# Patient Record
Sex: MALE | Race: WHITE | Employment: OTHER | URBAN - METROPOLITAN AREA
[De-identification: names, ages, dates, MRNs, and addresses within clinical notes are randomized per-mention and may not be internally consistent; named-entity substitution may affect disease eponyms.]

---

## 2017-02-17 RX ORDER — POLYETHYLENE GLYCOL 3350 17 G/17G
17 POWDER, FOR SOLUTION ORAL DAILY PRN
Qty: 510 G | Refills: 2 | Status: SHIPPED | OUTPATIENT
Start: 2017-02-17 | End: 2017-07-15 | Stop reason: SDUPTHER

## 2017-04-04 ENCOUNTER — OFFICE VISIT (OUTPATIENT)
Dept: FAMILY MEDICINE CLINIC | Age: 62
End: 2017-04-04
Payer: MEDICARE

## 2017-04-04 VITALS
OXYGEN SATURATION: 97 % | HEIGHT: 75 IN | BODY MASS INDEX: 25.99 KG/M2 | DIASTOLIC BLOOD PRESSURE: 78 MMHG | HEART RATE: 84 BPM | WEIGHT: 209 LBS | SYSTOLIC BLOOD PRESSURE: 124 MMHG

## 2017-04-04 DIAGNOSIS — E11.9 TYPE 2 DIABETES MELLITUS WITHOUT COMPLICATION, WITHOUT LONG-TERM CURRENT USE OF INSULIN (HCC): Primary | ICD-10-CM

## 2017-04-04 DIAGNOSIS — I10 ESSENTIAL HYPERTENSION: ICD-10-CM

## 2017-04-04 LAB
CREATININE URINE POCT: NORMAL
HBA1C MFR BLD: 6.4 %
MICROALBUMIN/CREAT 24H UR: NORMAL MG/G{CREAT}
MICROALBUMIN/CREAT UR-RTO: NORMAL

## 2017-04-04 PROCEDURE — 83036 HEMOGLOBIN GLYCOSYLATED A1C: CPT | Performed by: FAMILY MEDICINE

## 2017-04-04 PROCEDURE — G8427 DOCREV CUR MEDS BY ELIG CLIN: HCPCS | Performed by: FAMILY MEDICINE

## 2017-04-04 PROCEDURE — 82044 UR ALBUMIN SEMIQUANTITATIVE: CPT | Performed by: FAMILY MEDICINE

## 2017-04-04 PROCEDURE — G8419 CALC BMI OUT NRM PARAM NOF/U: HCPCS | Performed by: FAMILY MEDICINE

## 2017-04-04 PROCEDURE — 99213 OFFICE O/P EST LOW 20 MIN: CPT | Performed by: FAMILY MEDICINE

## 2017-04-04 PROCEDURE — 3017F COLORECTAL CA SCREEN DOC REV: CPT | Performed by: FAMILY MEDICINE

## 2017-04-04 PROCEDURE — 3044F HG A1C LEVEL LT 7.0%: CPT | Performed by: FAMILY MEDICINE

## 2017-04-04 PROCEDURE — 1036F TOBACCO NON-USER: CPT | Performed by: FAMILY MEDICINE

## 2017-04-04 RX ORDER — DIVALPROEX SODIUM 500 MG/1
TABLET, EXTENDED RELEASE ORAL
Refills: 0 | COMMUNITY
Start: 2017-03-07 | End: 2018-01-24 | Stop reason: DRUGHIGH

## 2017-04-04 RX ORDER — BENZTROPINE MESYLATE 0.5 MG/1
TABLET ORAL
Refills: 0 | COMMUNITY
Start: 2017-03-07 | End: 2017-04-04 | Stop reason: SDUPTHER

## 2017-04-04 ASSESSMENT — ENCOUNTER SYMPTOMS
CONSTIPATION: 1
SHORTNESS OF BREATH: 0
COUGH: 0
ABDOMINAL PAIN: 0
NAUSEA: 0
SORE THROAT: 0
DIARRHEA: 0
RHINORRHEA: 0

## 2017-04-06 ENCOUNTER — HOSPITAL ENCOUNTER (OUTPATIENT)
Age: 62
Discharge: HOME | End: 2017-04-06
Payer: MEDICARE

## 2017-04-06 LAB
ABSOLUTE EOS #: 0.1 K/UL (ref 0–0.4)
ABSOLUTE LYMPH #: 1.5 K/UL (ref 0.9–2.5)
ABSOLUTE MONO #: 0.6 K/UL (ref 0–1)
ALBUMIN SERPL-MCNC: 4.3 G/DL (ref 3.5–5.2)
ALBUMIN/GLOBULIN RATIO: NORMAL (ref 1–2.5)
ALP BLD-CCNC: 52 U/L (ref 40–129)
ALT SERPL-CCNC: 17 U/L (ref 5–41)
AST SERPL-CCNC: 18 U/L
BASOPHILS # BLD: 1 % (ref 0–2)
BASOPHILS ABSOLUTE: 0 K/UL (ref 0–0.2)
BILIRUB SERPL-MCNC: 0.4 MG/DL (ref 0.3–1.2)
BILIRUBIN DIRECT: <0.08 MG/DL
BILIRUBIN, INDIRECT: NORMAL MG/DL (ref 0–1)
DIFFERENTIAL TYPE: YES
EOSINOPHILS RELATIVE PERCENT: 2 % (ref 0–5)
GLOBULIN: NORMAL G/DL (ref 1.5–3.8)
HCT VFR BLD CALC: 40.3 % (ref 41–53)
HEMOGLOBIN: 13.7 G/DL (ref 13.5–17.5)
LYMPHOCYTES # BLD: 26 % (ref 13–44)
MCH RBC QN AUTO: 31.2 PG (ref 26–34)
MCHC RBC AUTO-ENTMCNC: 34.1 G/DL (ref 31–37)
MCV RBC AUTO: 91.4 FL (ref 80–100)
MONOCYTES # BLD: 11 % (ref 5–9)
PDW BLD-RTO: 14.7 % (ref 12.1–15.2)
PLATELET # BLD: 150 K/UL (ref 140–450)
PLATELET ESTIMATE: ABNORMAL
PMV BLD AUTO: ABNORMAL FL (ref 6–12)
RBC # BLD: 4.41 M/UL (ref 4.5–5.9)
RBC # BLD: ABNORMAL 10*6/UL
SEG NEUTROPHILS: 60 % (ref 39–75)
SEGMENTED NEUTROPHILS ABSOLUTE COUNT: 3.6 K/UL (ref 2.1–6.5)
TOTAL PROTEIN: 7.5 G/DL (ref 6.4–8.3)
VALPROIC ACID LEVEL: 51 UG/ML (ref 50–100)
VALPROIC DATE LAST DOSE: NORMAL
VALPROIC DOSE AMOUNT: NORMAL
VALPROIC TIME LAST DOSE: NORMAL
WBC # BLD: 5.9 K/UL (ref 3.5–11)
WBC # BLD: ABNORMAL 10*3/UL

## 2017-04-06 PROCEDURE — 36415 COLL VENOUS BLD VENIPUNCTURE: CPT

## 2017-04-06 PROCEDURE — 80164 ASSAY DIPROPYLACETIC ACD TOT: CPT

## 2017-04-06 PROCEDURE — 80076 HEPATIC FUNCTION PANEL: CPT

## 2017-04-06 PROCEDURE — 85025 COMPLETE CBC W/AUTO DIFF WBC: CPT

## 2017-05-31 ENCOUNTER — CARE COORDINATION (OUTPATIENT)
Dept: CARE COORDINATION | Age: 62
End: 2017-05-31

## 2017-06-05 RX ORDER — FINASTERIDE 5 MG/1
TABLET, FILM COATED ORAL
Qty: 30 TABLET | Refills: 11 | Status: SHIPPED | OUTPATIENT
Start: 2017-06-05 | End: 2018-06-25 | Stop reason: SDUPTHER

## 2017-06-15 ENCOUNTER — OFFICE VISIT (OUTPATIENT)
Dept: UROLOGY | Age: 62
End: 2017-06-15
Payer: MEDICARE

## 2017-06-15 ENCOUNTER — HOSPITAL ENCOUNTER (OUTPATIENT)
Age: 62
Discharge: HOME | End: 2017-06-15
Payer: MEDICARE

## 2017-06-15 VITALS
HEIGHT: 75 IN | DIASTOLIC BLOOD PRESSURE: 74 MMHG | SYSTOLIC BLOOD PRESSURE: 116 MMHG | BODY MASS INDEX: 27.48 KG/M2 | WEIGHT: 221 LBS

## 2017-06-15 DIAGNOSIS — N40.1 BENIGN NON-NODULAR PROSTATIC HYPERPLASIA WITH LOWER URINARY TRACT SYMPTOMS: Primary | ICD-10-CM

## 2017-06-15 DIAGNOSIS — N40.1 BENIGN NON-NODULAR PROSTATIC HYPERPLASIA WITH LOWER URINARY TRACT SYMPTOMS: ICD-10-CM

## 2017-06-15 DIAGNOSIS — R39.15 URINARY URGENCY: ICD-10-CM

## 2017-06-15 DIAGNOSIS — Z12.5 PROSTATE CANCER SCREENING: ICD-10-CM

## 2017-06-15 DIAGNOSIS — R35.0 URINARY FREQUENCY: ICD-10-CM

## 2017-06-15 LAB
-: NORMAL
AMORPHOUS: NORMAL
BACTERIA: NORMAL
BILIRUBIN URINE: NEGATIVE
CASTS UA: NORMAL /LPF
COLOR: YELLOW
COMMENT UA: NORMAL
CRYSTALS, UA: NORMAL /HPF
EPITHELIAL CELLS UA: NORMAL /HPF
GLUCOSE URINE: NEGATIVE
KETONES, URINE: NEGATIVE
LEUKOCYTE ESTERASE, URINE: NEGATIVE
MUCUS: NORMAL
NITRITE, URINE: NEGATIVE
OTHER OBSERVATIONS UA: NORMAL
PH UA: 7 (ref 5–8)
PROSTATE SPECIFIC ANTIGEN: 0.21 UG/L
PROTEIN UA: NEGATIVE
RBC UA: NORMAL /HPF (ref 0–2)
RENAL EPITHELIAL, UA: NORMAL /HPF
SPECIFIC GRAVITY UA: 1.01 (ref 1–1.03)
TRICHOMONAS: NORMAL
TURBIDITY: CLEAR
URINE HGB: NEGATIVE
UROBILINOGEN, URINE: NORMAL
WBC UA: NORMAL /HPF
YEAST: NORMAL

## 2017-06-15 PROCEDURE — 3017F COLORECTAL CA SCREEN DOC REV: CPT | Performed by: PHYSICIAN ASSISTANT

## 2017-06-15 PROCEDURE — 99214 OFFICE O/P EST MOD 30 MIN: CPT | Performed by: PHYSICIAN ASSISTANT

## 2017-06-15 PROCEDURE — G0103 PSA SCREENING: HCPCS

## 2017-06-15 PROCEDURE — G8419 CALC BMI OUT NRM PARAM NOF/U: HCPCS | Performed by: PHYSICIAN ASSISTANT

## 2017-06-15 PROCEDURE — G8427 DOCREV CUR MEDS BY ELIG CLIN: HCPCS | Performed by: PHYSICIAN ASSISTANT

## 2017-06-15 PROCEDURE — 81001 URINALYSIS AUTO W/SCOPE: CPT

## 2017-06-15 PROCEDURE — 51798 US URINE CAPACITY MEASURE: CPT | Performed by: PHYSICIAN ASSISTANT

## 2017-06-15 PROCEDURE — 36415 COLL VENOUS BLD VENIPUNCTURE: CPT

## 2017-06-15 PROCEDURE — 1036F TOBACCO NON-USER: CPT | Performed by: PHYSICIAN ASSISTANT

## 2017-06-15 PROCEDURE — 87086 URINE CULTURE/COLONY COUNT: CPT

## 2017-06-15 RX ORDER — TAMSULOSIN HYDROCHLORIDE 0.4 MG/1
0.4 CAPSULE ORAL EVERY EVENING
Qty: 30 CAPSULE | Refills: 11 | Status: SHIPPED | OUTPATIENT
Start: 2017-06-15 | End: 2018-06-25 | Stop reason: SDUPTHER

## 2017-06-15 ASSESSMENT — ENCOUNTER SYMPTOMS
CONSTIPATION: 0
ABDOMINAL DISTENTION: 0
COLOR CHANGE: 0
NAUSEA: 0
VOMITING: 0
DIARRHEA: 0
SHORTNESS OF BREATH: 0
ABDOMINAL PAIN: 0

## 2017-06-16 LAB
CULTURE: NO GROWTH
CULTURE: NORMAL
Lab: NORMAL
SPECIMEN DESCRIPTION: NORMAL
SPECIMEN DESCRIPTION: NORMAL
STATUS: NORMAL

## 2017-06-29 RX ORDER — LANCETS 30 GAUGE
EACH MISCELLANEOUS
Qty: 100 EACH | Refills: 3 | Status: SHIPPED | OUTPATIENT
Start: 2017-06-29

## 2017-07-17 RX ORDER — POLYETHYLENE GLYCOL 3350 17 G/17G
POWDER, FOR SOLUTION ORAL
Qty: 1 BOTTLE | Refills: 2 | Status: SHIPPED | OUTPATIENT
Start: 2017-07-17 | End: 2018-01-20 | Stop reason: ALTCHOICE

## 2017-10-05 ENCOUNTER — HOSPITAL ENCOUNTER (OUTPATIENT)
Age: 62
Discharge: HOME | End: 2017-10-05
Payer: MEDICARE

## 2017-10-05 ENCOUNTER — OFFICE VISIT (OUTPATIENT)
Dept: FAMILY MEDICINE CLINIC | Age: 62
End: 2017-10-05
Payer: MEDICARE

## 2017-10-05 VITALS
OXYGEN SATURATION: 96 % | SYSTOLIC BLOOD PRESSURE: 110 MMHG | BODY MASS INDEX: 25.86 KG/M2 | DIASTOLIC BLOOD PRESSURE: 72 MMHG | HEART RATE: 68 BPM | WEIGHT: 208 LBS | HEIGHT: 75 IN

## 2017-10-05 DIAGNOSIS — E11.9 TYPE 2 DIABETES MELLITUS WITHOUT COMPLICATION, WITHOUT LONG-TERM CURRENT USE OF INSULIN (HCC): Primary | ICD-10-CM

## 2017-10-05 DIAGNOSIS — I10 ESSENTIAL HYPERTENSION: ICD-10-CM

## 2017-10-05 DIAGNOSIS — E11.9 TYPE 2 DIABETES MELLITUS WITHOUT COMPLICATION, WITHOUT LONG-TERM CURRENT USE OF INSULIN (HCC): ICD-10-CM

## 2017-10-05 LAB
ABSOLUTE EOS #: 0.1 K/UL (ref 0–0.4)
ABSOLUTE LYMPH #: 1.5 K/UL (ref 1–4.8)
ABSOLUTE MONO #: 0.6 K/UL (ref 0–1)
ALBUMIN SERPL-MCNC: 4.4 G/DL (ref 3.5–5.2)
ALBUMIN SERPL-MCNC: 4.4 G/DL (ref 3.5–5.2)
ALBUMIN/GLOBULIN RATIO: ABNORMAL (ref 1–2.5)
ALBUMIN/GLOBULIN RATIO: NORMAL (ref 1–2.5)
ALP BLD-CCNC: 45 U/L (ref 40–129)
ALP BLD-CCNC: 45 U/L (ref 40–129)
ALT SERPL-CCNC: 17 U/L (ref 5–41)
ALT SERPL-CCNC: 17 U/L (ref 5–41)
ANION GAP SERPL CALCULATED.3IONS-SCNC: 10 MMOL/L (ref 9–17)
AST SERPL-CCNC: 19 U/L
AST SERPL-CCNC: 19 U/L
BASOPHILS # BLD: 0 %
BASOPHILS ABSOLUTE: 0 K/UL (ref 0–0.2)
BILIRUB SERPL-MCNC: 0.41 MG/DL (ref 0.3–1.2)
BILIRUB SERPL-MCNC: 0.41 MG/DL (ref 0.3–1.2)
BILIRUBIN DIRECT: <0.08 MG/DL
BILIRUBIN, INDIRECT: NORMAL MG/DL (ref 0–1)
BUN BLDV-MCNC: 19 MG/DL (ref 8–23)
BUN/CREAT BLD: 19 (ref 9–20)
CALCIUM SERPL-MCNC: 9.7 MG/DL (ref 8.6–10.4)
CHLORIDE BLD-SCNC: 103 MMOL/L (ref 98–107)
CHOLESTEROL/HDL RATIO: 2.6
CHOLESTEROL/HDL RATIO: 2.6
CHOLESTEROL: 129 MG/DL
CHOLESTEROL: 129 MG/DL
CO2: 27 MMOL/L (ref 20–31)
CREAT SERPL-MCNC: 1 MG/DL (ref 0.7–1.2)
DIFFERENTIAL TYPE: YES
EOSINOPHILS RELATIVE PERCENT: 2 %
GFR AFRICAN AMERICAN: >60 ML/MIN
GFR NON-AFRICAN AMERICAN: >60 ML/MIN
GFR SERPL CREATININE-BSD FRML MDRD: ABNORMAL ML/MIN/{1.73_M2}
GFR SERPL CREATININE-BSD FRML MDRD: ABNORMAL ML/MIN/{1.73_M2}
GLOBULIN: NORMAL G/DL (ref 1.5–3.8)
GLUCOSE BLD-MCNC: 106 MG/DL (ref 70–99)
GLUCOSE BLD-MCNC: 106 MG/DL (ref 70–99)
HBA1C MFR BLD: 5.9 %
HCT VFR BLD CALC: 40.8 % (ref 41–53)
HDLC SERPL-MCNC: 49 MG/DL
HDLC SERPL-MCNC: 49 MG/DL
HEMOGLOBIN: 13.4 G/DL (ref 13.5–17.5)
LDL CHOLESTEROL: 64 MG/DL (ref 0–130)
LDL CHOLESTEROL: 64 MG/DL (ref 0–130)
LYMPHOCYTES # BLD: 22 %
MCH RBC QN AUTO: 30.7 PG (ref 26–34)
MCHC RBC AUTO-ENTMCNC: 32.8 G/DL (ref 31–37)
MCV RBC AUTO: 93.6 FL (ref 80–100)
MONOCYTES # BLD: 8 %
PATIENT FASTING?: YES
PATIENT FASTING?: YES
PDW BLD-RTO: 14.4 % (ref 12.1–15.2)
PLATELET # BLD: 132 K/UL (ref 140–450)
PLATELET ESTIMATE: ABNORMAL
PMV BLD AUTO: ABNORMAL FL (ref 6–12)
POTASSIUM SERPL-SCNC: 4.8 MMOL/L (ref 3.7–5.3)
RBC # BLD: 4.36 M/UL (ref 4.5–5.9)
RBC # BLD: ABNORMAL 10*6/UL
SEG NEUTROPHILS: 68 %
SEGMENTED NEUTROPHILS ABSOLUTE COUNT: 4.5 K/UL (ref 2.1–6.5)
SODIUM BLD-SCNC: 140 MMOL/L (ref 135–144)
TOTAL PROTEIN: 7.7 G/DL (ref 6.4–8.3)
TOTAL PROTEIN: 7.7 G/DL (ref 6.4–8.3)
TRIGL SERPL-MCNC: 79 MG/DL
TRIGL SERPL-MCNC: 79 MG/DL
VALPROIC ACID LEVEL: 74 UG/ML (ref 50–100)
VALPROIC DATE LAST DOSE: NORMAL
VALPROIC DOSE AMOUNT: NORMAL
VALPROIC TIME LAST DOSE: NORMAL
VLDLC SERPL CALC-MCNC: NORMAL MG/DL (ref 1–30)
VLDLC SERPL CALC-MCNC: NORMAL MG/DL (ref 1–30)
WBC # BLD: 6.7 K/UL (ref 3.5–11)
WBC # BLD: ABNORMAL 10*3/UL

## 2017-10-05 PROCEDURE — 85025 COMPLETE CBC W/AUTO DIFF WBC: CPT

## 2017-10-05 PROCEDURE — 80076 HEPATIC FUNCTION PANEL: CPT

## 2017-10-05 PROCEDURE — 83036 HEMOGLOBIN GLYCOSYLATED A1C: CPT | Performed by: FAMILY MEDICINE

## 2017-10-05 PROCEDURE — 1036F TOBACCO NON-USER: CPT | Performed by: FAMILY MEDICINE

## 2017-10-05 PROCEDURE — 80053 COMPREHEN METABOLIC PANEL: CPT

## 2017-10-05 PROCEDURE — 80061 LIPID PANEL: CPT

## 2017-10-05 PROCEDURE — G8484 FLU IMMUNIZE NO ADMIN: HCPCS | Performed by: FAMILY MEDICINE

## 2017-10-05 PROCEDURE — G8417 CALC BMI ABV UP PARAM F/U: HCPCS | Performed by: FAMILY MEDICINE

## 2017-10-05 PROCEDURE — 82947 ASSAY GLUCOSE BLOOD QUANT: CPT

## 2017-10-05 PROCEDURE — 3046F HEMOGLOBIN A1C LEVEL >9.0%: CPT | Performed by: FAMILY MEDICINE

## 2017-10-05 PROCEDURE — 80164 ASSAY DIPROPYLACETIC ACD TOT: CPT

## 2017-10-05 PROCEDURE — 99213 OFFICE O/P EST LOW 20 MIN: CPT | Performed by: FAMILY MEDICINE

## 2017-10-05 PROCEDURE — 36415 COLL VENOUS BLD VENIPUNCTURE: CPT

## 2017-10-05 PROCEDURE — G8427 DOCREV CUR MEDS BY ELIG CLIN: HCPCS | Performed by: FAMILY MEDICINE

## 2017-10-05 PROCEDURE — 3017F COLORECTAL CA SCREEN DOC REV: CPT | Performed by: FAMILY MEDICINE

## 2017-10-05 RX ORDER — GABAPENTIN 300 MG/1
300 CAPSULE ORAL DAILY
Qty: 90 CAPSULE | Refills: 3 | Status: SHIPPED | OUTPATIENT
Start: 2017-10-05 | End: 2018-07-05 | Stop reason: SDUPTHER

## 2017-10-05 RX ORDER — INFLUENZA VIRUS VACCINE 15; 15; 15; 15 UG/.5ML; UG/.5ML; UG/.5ML; UG/.5ML
SUSPENSION INTRAMUSCULAR
Refills: 0 | COMMUNITY
Start: 2017-09-29 | End: 2017-10-22 | Stop reason: ALTCHOICE

## 2017-10-05 ASSESSMENT — PATIENT HEALTH QUESTIONNAIRE - PHQ9
SUM OF ALL RESPONSES TO PHQ9 QUESTIONS 1 & 2: 0
SUM OF ALL RESPONSES TO PHQ QUESTIONS 1-9: 0
2. FEELING DOWN, DEPRESSED OR HOPELESS: 0
1. LITTLE INTEREST OR PLEASURE IN DOING THINGS: 0

## 2017-10-05 ASSESSMENT — ENCOUNTER SYMPTOMS
DIARRHEA: 0
ABDOMINAL PAIN: 0
CONSTIPATION: 0
COUGH: 0
BACK PAIN: 0
RHINORRHEA: 0
SHORTNESS OF BREATH: 0
SORE THROAT: 0

## 2017-10-05 NOTE — MR AVS SNAPSHOT
After Visit Summary             Frank Mendiola   10/5/2017 11:00 AM   Office Visit    Description:  Male : 1955   Provider:  Erma Aleman DO   Department:  [facility]              Your Follow-Up and Future Appointments         Below is a list of your follow-up and future appointments. This may not be a complete list as you may have made appointments directly with providers that we are not aware of or your providers may have made some for you. Please call your providers to confirm appointments. It is important to keep your appointments. Please bring your current insurance card, photo ID, co-pay, and all medication bottles to your appointment. If self-pay, payment is expected at the time of service. Your To-Do List     Future Appointments Provider Department Dept Phone    2018 11:00 AM Erma Aleman DO [facility] PRIMARY CARE Ed Ferguson [phone]    2018 2:00 PM Ettie Scheuermann, MD [facility] Urology [phone]    Please arrive 15 minutes prior to appointment, bring photo ID and insurance card. Future Orders Complete By Expires    Comprehensive Metabolic Panel [RUI68 Custom]  10/5/2017 10/5/2018    Lipid Panel Gala Francis Custom]  1/3/2018 10/5/2018    Follow-Up    Return in about 6 months (around 2018).          Information from Your Visit        Department     Name Address Phone Fax    [facility] [address] [phone]      You Were Seen for:         Comments    Type 2 diabetes mellitus without complication, without long-term current use of insulin ([facility]   [[id number]]         Vital Signs     Blood Pressure Pulse Height Weight Oxygen Saturation Body Mass Index    110/72 (Site: Right Arm) 68 6' 3\" (1.905 m) 208 lb (94.3 kg) 96% 26 kg/m2    Smoking Status                   Never Smoker           Additional Information about your Body Mass Index (BMI) risperiDONE (RISPERDAL) 1 MG tablet Take 1 mg by mouth Take in AM    risperiDONE (RISPERDAL) 2 MG tablet Take 2 mg by mouth daily     clonazePAM (KLONOPIN) 1 MG tablet Take 1 tablet by mouth 2 times daily. Lancet Device [facility] Delico Lancet Device. Use daily    Blood Glucose Monitoring Suppl KELLY Please dispense One Touch Ultra meter    propranolol (INDERAL) 40 MG tablet Take 40 mg by mouth 2 times daily.       FLUARIX QUADRIVALENT 0.5 ML injection inject 0.5 milliliter intramuscularly    polyethylene glycol (GLYCOLAX) powder take 17GM (DISSOLVED IN WATER) by mouth once daily if needed for constipation      Allergies           No Known Allergies      We Ordered/Performed the following           POCT glycosylated hemoglobin (Hb A1C)          Additional Information        Basic Information     Date Of Birth Sex Race Ethnicity Preferred Language    1955 Male White Non-/Non  English      Problem List as of 10/5/2017  Date Reviewed: 10/5/2017                Benign non-nodular prostatic hyperplasia with lower urinary tract symptoms    Urinary retention    Generalized weakness    Scrotal irritation    Blood in stool    BPH (benign prostatic hyperplasia)    Microscopic hematuria    Yeast dermatitis of penis    Hematuria    Lower urinary tract symptoms (LUTS)    Type 2 diabetes mellitus ([facility]    Essential hypertension, benign      Your Goals as of 10/5/2017 at 11:33 AM              5/3/16       Lifestyle    Patient will remain safe in his home   On track    Patient will be compliant with following up with Psych   On track      Immunizations as of 10/5/2017     Name Date    Influenza Vaccine, unspecified formulation 10/11/2016    Influenza Virus Vaccine 8/20/2015, 8/12/2014, 8/25/2013, 9/12/2012    Influenza, High Dose 9/29/2017    Pneumococcal 13-valent Conjugate ([id number]) 8/20/2015    Pneumococcal Polysaccharide (Oarlerrbj71) 10/11/2016    Zoster 6/9/2014      Preventive Care        Date Due Additional Information  If you have questions, please contact the physician practice where you receive care. Remember, [employer] is NOT to be used for urgent needs. For medical emergencies, dial 911. For questions regarding your [employer] account call [phone]. If you have a clinical question, please call your doctor's office.

## 2017-10-05 NOTE — PROGRESS NOTES
Subjective:      Patient ID: Peg Friday is a 65 y.o. male. HPI Comments: 65year old male with history of type 2 DM, hypertension, BPH and schizophrenia for check up. Has lost 1 lb since last visit. Bring in glucose log which is difficult to read. A1c in office is 5.9 today. Medications reviewed. Review of Systems   Constitutional: Negative for chills and fever. HENT: Negative for congestion, rhinorrhea and sore throat. Respiratory: Negative for cough and shortness of breath. Cardiovascular: Negative for chest pain. Gastrointestinal: Negative for abdominal pain, constipation and diarrhea. Genitourinary: Positive for frequency and urgency. Negative for dysuria. Musculoskeletal: Negative for back pain and joint swelling. Neurological: Positive for tremors. Negative for dizziness and light-headedness. Psychiatric/Behavioral: Negative for confusion. The patient is nervous/anxious. Objective:   Physical Exam   Constitutional: He is oriented to person, place, and time. He appears well-developed and well-nourished. HENT:   Head: Normocephalic and atraumatic. Eyes: Conjunctivae are normal. No scleral icterus. Neck: No thyromegaly present. No bruits   Cardiovascular: Normal rate, regular rhythm, normal heart sounds and intact distal pulses. Pulmonary/Chest: Effort normal and breath sounds normal. He has no rales. Abdominal: Soft. Bowel sounds are normal. There is no tenderness. Musculoskeletal: He exhibits no edema or tenderness. Neurological: He is alert and oriented to person, place, and time. Skin: Skin is warm and dry. Nursing note and vitals reviewed.       Assessment:      Type 2 DM controlled  Hypertension controlled        Plan:      Continue all meds  Follow up with psych  check labs      Warrenton Kelsy received counseling on the following healthy behaviors: medication adherence  Reviewed prior labs and health maintenance  Continue current medications, diet and exercise. Discussed use, benefit, and side effects of prescribed medications. Barriers to medication compliance addressed. Patient given educational materials - see patient instructions  Was a self-tracking handout given in paper form or via [employer]? No    Requested Prescriptions     Signed Prescriptions Disp Refills    gabapentin (NEURONTIN) 300 MG capsule 90 capsule 3     Sig: Take 1 capsule by mouth daily       All patient questions answered. Patient voiced understanding. Quality Measures    Body mass index is 26 kg/(m^2). Elevated. Weight control planned discussed Healthy diet and regular exercise. BP: 110/72 Blood pressure is normal. Treatment plan consists of No treatment change needed.     Lab Results   Component Value Date    LDLCHOLESTEROL 64 10/05/2017    (goal LDL reduction with dx if diabetes is 50% LDL reduction)      PHQ Scores 10/5/2017 12/7/2015   PHQ2 Score 0 0   PHQ9 Score 0 0     Interpretation of Total Score Depression Severity: 1-4 = Minimal depression, 5-9 = Mild depression, 10-14 = Moderate depression, 15-19 = Moderately severe depression, 20-27 = Severe depression

## 2017-10-22 ENCOUNTER — HOSPITAL ENCOUNTER (EMERGENCY)
Age: 62
Discharge: HOME | End: 2017-10-22
Attending: FAMILY MEDICINE
Payer: MEDICARE

## 2017-10-22 ENCOUNTER — APPOINTMENT (OUTPATIENT)
Dept: GENERAL RADIOLOGY | Age: 62
End: 2017-10-22
Payer: MEDICARE

## 2017-10-22 VITALS
HEART RATE: 64 BPM | RESPIRATION RATE: 20 BRPM | TEMPERATURE: 98 F | DIASTOLIC BLOOD PRESSURE: 75 MMHG | OXYGEN SATURATION: 96 % | SYSTOLIC BLOOD PRESSURE: 135 MMHG

## 2017-10-22 DIAGNOSIS — R60.0 BILATERAL LOWER EXTREMITY EDEMA: Primary | ICD-10-CM

## 2017-10-22 LAB
ABSOLUTE EOS #: 0.1 K/UL (ref 0–0.4)
ABSOLUTE IMMATURE GRANULOCYTE: ABNORMAL K/UL (ref 0–0.3)
ABSOLUTE LYMPH #: 1.3 K/UL (ref 1–4.8)
ABSOLUTE MONO #: 0.5 K/UL (ref 0–1)
ALBUMIN SERPL-MCNC: 4.5 G/DL (ref 3.5–5.2)
ALBUMIN/GLOBULIN RATIO: NORMAL (ref 1–2.5)
ALP BLD-CCNC: 44 U/L (ref 40–129)
ALT SERPL-CCNC: 17 U/L (ref 5–41)
ANION GAP SERPL CALCULATED.3IONS-SCNC: 12 MMOL/L (ref 9–17)
AST SERPL-CCNC: 19 U/L
BASOPHILS # BLD: 1 %
BASOPHILS ABSOLUTE: 0 K/UL (ref 0–0.2)
BILIRUB SERPL-MCNC: 0.65 MG/DL (ref 0.3–1.2)
BILIRUBIN DIRECT: <0.08 MG/DL
BILIRUBIN, INDIRECT: NORMAL MG/DL (ref 0–1)
BNP INTERPRETATION: NORMAL
BUN BLDV-MCNC: 17 MG/DL (ref 8–23)
BUN/CREAT BLD: 18 (ref 9–20)
CALCIUM SERPL-MCNC: 9.6 MG/DL (ref 8.6–10.4)
CHLORIDE BLD-SCNC: 100 MMOL/L (ref 98–107)
CO2: 28 MMOL/L (ref 20–31)
CREAT SERPL-MCNC: 0.96 MG/DL (ref 0.7–1.2)
DIFFERENTIAL TYPE: YES
EKG ATRIAL RATE: 62 BPM
EKG P AXIS: 19 DEGREES
EKG P-R INTERVAL: 170 MS
EKG Q-T INTERVAL: 420 MS
EKG QRS DURATION: 96 MS
EKG QTC CALCULATION (BAZETT): 426 MS
EKG R AXIS: 84 DEGREES
EKG T AXIS: 52 DEGREES
EKG VENTRICULAR RATE: 62 BPM
EOSINOPHILS RELATIVE PERCENT: 2 %
GFR AFRICAN AMERICAN: >60 ML/MIN
GFR NON-AFRICAN AMERICAN: >60 ML/MIN
GFR SERPL CREATININE-BSD FRML MDRD: ABNORMAL ML/MIN/{1.73_M2}
GFR SERPL CREATININE-BSD FRML MDRD: ABNORMAL ML/MIN/{1.73_M2}
GLOBULIN: NORMAL G/DL (ref 1.5–3.8)
GLUCOSE BLD-MCNC: 105 MG/DL (ref 70–99)
HCT VFR BLD CALC: 40.6 % (ref 41–53)
HEMOGLOBIN: 13.4 G/DL (ref 13.5–17.5)
IMMATURE GRANULOCYTES: ABNORMAL %
LYMPHOCYTES # BLD: 25 %
MCH RBC QN AUTO: 31 PG (ref 26–34)
MCHC RBC AUTO-ENTMCNC: 33.2 G/DL (ref 31–37)
MCV RBC AUTO: 93.6 FL (ref 80–100)
MONOCYTES # BLD: 9 %
PDW BLD-RTO: 14.7 % (ref 12.1–15.2)
PLATELET # BLD: 152 K/UL (ref 140–450)
PLATELET ESTIMATE: ABNORMAL
PMV BLD AUTO: ABNORMAL FL (ref 6–12)
POTASSIUM SERPL-SCNC: 4.3 MMOL/L (ref 3.7–5.3)
PRO-BNP: 68 PG/ML
RBC # BLD: 4.34 M/UL (ref 4.5–5.9)
RBC # BLD: ABNORMAL 10*6/UL
SEG NEUTROPHILS: 63 %
SEGMENTED NEUTROPHILS ABSOLUTE COUNT: 3.5 K/UL (ref 2.1–6.5)
SODIUM BLD-SCNC: 140 MMOL/L (ref 135–144)
TOTAL PROTEIN: 7.6 G/DL (ref 6.4–8.3)
TROPONIN INTERP: NORMAL
TROPONIN T: <0.03 NG/ML
WBC # BLD: 5.4 K/UL (ref 3.5–11)
WBC # BLD: ABNORMAL 10*3/UL

## 2017-10-22 PROCEDURE — 85025 COMPLETE CBC W/AUTO DIFF WBC: CPT

## 2017-10-22 PROCEDURE — 83880 ASSAY OF NATRIURETIC PEPTIDE: CPT

## 2017-10-22 PROCEDURE — 84484 ASSAY OF TROPONIN QUANT: CPT

## 2017-10-22 PROCEDURE — 99285 EMERGENCY DEPT VISIT HI MDM: CPT

## 2017-10-22 PROCEDURE — 80076 HEPATIC FUNCTION PANEL: CPT

## 2017-10-22 PROCEDURE — 80048 BASIC METABOLIC PNL TOTAL CA: CPT

## 2017-10-22 PROCEDURE — 93005 ELECTROCARDIOGRAM TRACING: CPT

## 2017-10-22 PROCEDURE — 71010 XR CHEST LIMITED ONE VIEW: CPT

## 2017-10-22 RX ORDER — FUROSEMIDE 40 MG/1
40 TABLET ORAL DAILY
Qty: 7 TABLET | Refills: 0 | Status: SHIPPED | OUTPATIENT
Start: 2017-10-22 | End: 2018-01-20 | Stop reason: ALTCHOICE

## 2017-10-22 ASSESSMENT — PAIN DESCRIPTION - DESCRIPTORS: DESCRIPTORS: SORE

## 2017-10-22 ASSESSMENT — PAIN DESCRIPTION - PAIN TYPE: TYPE: ACUTE PAIN

## 2017-10-22 ASSESSMENT — PAIN DESCRIPTION - LOCATION: LOCATION: ARM

## 2017-10-22 ASSESSMENT — PAIN DESCRIPTION - FREQUENCY: FREQUENCY: INTERMITTENT

## 2017-10-22 ASSESSMENT — PAIN DESCRIPTION - ORIENTATION: ORIENTATION: RIGHT

## 2017-10-22 ASSESSMENT — PAIN DESCRIPTION - PROGRESSION: CLINICAL_PROGRESSION: NOT CHANGED

## 2017-10-22 NOTE — ED TRIAGE NOTES
List of medications patient is currently taking is complete.   Source of medications in list are the pt

## 2017-10-22 NOTE — ED PROVIDER NOTES
Deepak 1979  eMERGENCY dEPARTMENT eNCOUnter          Jermaine Vizcarra       Chief Complaint   Patient presents with    Leg Swelling     bilateral leg swelling for past month       Nurses Notes reviewed and I agree except as noted in the HPI. HISTORY OF PRESENT ILLNESS    Saloni Matias is a 65 y.o. male who presents To the emergency room via EMS from home, with patient complaining of bilateral lower extremity edema as well as right upper extremity pain. Patient states the swelling has been going on for the past month, denies any known cardiac history, denies being any type of diuretic or water pill. Patient denies any chest pain or palpitations denies shortness of breath difficulty breathing, does admit to using a lot of salt as he has been trying to add taste to increased fruit/vegetable diet, and feels he has been losing weight. Patient denies any swelling or distention of his abdomen or scrotum. Patient also indicates she's been having pain in his right upper extremity, has been gone for at least the last month, states he has discussed with his primary care provider though indicates a few months prior, and points to pain both in the proximal forearm as well as in the biceps area. Denies any known trauma or falls denies any known heavy lifting pushing or pulling. Patient states that his primary care told him he had only strained his arm. Patient does not otherwise qualify or quantify the pain. Review of EMR notes that patient PCP had noted lower extremity edema 4/2017    REVIEW OF SYSTEMS     Review of Systems   All other systems reviewed and are negative. PAST MEDICAL HISTORY    has a past medical history of Chronic schizophrenic ([address] Hyperlipidemia; Hypertension; Neuromuscular disorder ([address] and Type II or unspecified type diabetes mellitus without mention of complication, not stated as uncontrolled.     SURGICAL HISTORY      has a past surgical history that

## 2017-10-24 ENCOUNTER — CARE COORDINATION (OUTPATIENT)
Dept: CARE COORDINATION | Age: 62
End: 2017-10-24

## 2017-10-24 NOTE — CARE COORDINATION
Patient was called to follow up with most recent ER visit. There was no answer. A message was left on voicemail to have patient call back regarding ER visit. Office number given [phone].

## 2017-10-26 ENCOUNTER — OFFICE VISIT (OUTPATIENT)
Dept: FAMILY MEDICINE CLINIC | Age: 62
End: 2017-10-26
Payer: MEDICARE

## 2017-10-26 VITALS
SYSTOLIC BLOOD PRESSURE: 110 MMHG | BODY MASS INDEX: 24.75 KG/M2 | OXYGEN SATURATION: 96 % | HEART RATE: 74 BPM | WEIGHT: 198 LBS | DIASTOLIC BLOOD PRESSURE: 72 MMHG

## 2017-10-26 DIAGNOSIS — E11.9 TYPE 2 DIABETES MELLITUS WITHOUT COMPLICATION, WITHOUT LONG-TERM CURRENT USE OF INSULIN (HCC): ICD-10-CM

## 2017-10-26 DIAGNOSIS — R60.9 PITTING EDEMA: Primary | ICD-10-CM

## 2017-10-26 PROCEDURE — 3017F COLORECTAL CA SCREEN DOC REV: CPT | Performed by: FAMILY MEDICINE

## 2017-10-26 PROCEDURE — 3044F HG A1C LEVEL LT 7.0%: CPT | Performed by: FAMILY MEDICINE

## 2017-10-26 PROCEDURE — 1036F TOBACCO NON-USER: CPT | Performed by: FAMILY MEDICINE

## 2017-10-26 PROCEDURE — G8420 CALC BMI NORM PARAMETERS: HCPCS | Performed by: FAMILY MEDICINE

## 2017-10-26 PROCEDURE — 99213 OFFICE O/P EST LOW 20 MIN: CPT | Performed by: FAMILY MEDICINE

## 2017-10-26 PROCEDURE — G8484 FLU IMMUNIZE NO ADMIN: HCPCS | Performed by: FAMILY MEDICINE

## 2017-10-26 PROCEDURE — G8427 DOCREV CUR MEDS BY ELIG CLIN: HCPCS | Performed by: FAMILY MEDICINE

## 2017-10-26 ASSESSMENT — ENCOUNTER SYMPTOMS
RHINORRHEA: 0
SORE THROAT: 0
NAUSEA: 0
CONSTIPATION: 0
ABDOMINAL PAIN: 0
DIARRHEA: 0

## 2017-10-26 NOTE — PROGRESS NOTES
Subjective:      Patient ID: Alejandro Harding is a 65 y.o. male. 65year old male with history of type 2 DM, hypertension, schizophrenia . Was in ER on 10/22 because of leg swelling. Labs unremarkable, admitted to eating a lot of salty foods. Was given lasix 40 daily for 7 days and states legs are down and weight is down 10 lbs. Review of Systems   Constitutional: Negative for chills and fever. HENT: Negative for rhinorrhea and sore throat. Cardiovascular: Positive for leg swelling (improved). Gastrointestinal: Negative for abdominal pain, constipation, diarrhea and nausea. Neurological: Positive for light-headedness. Negative for dizziness and syncope. Objective:   Physical Exam   Constitutional: He is oriented to person, place, and time. HENT:   Head: Normocephalic. Mouth/Throat: No oropharyngeal exudate. Eyes: Conjunctivae are normal. No scleral icterus. Neck: No thyromegaly present. No bruits   Cardiovascular: Normal rate, regular rhythm and normal heart sounds. Pulmonary/Chest: Effort normal and breath sounds normal.   Abdominal: Soft. Bowel sounds are normal.   Musculoskeletal: He exhibits no edema. Neurological: He is alert and oriented to person, place, and time. Nursing note and vitals reviewed. Assessment:      Resolved pitting edema  Type 2 DM      Plan:      Finish lasix   Continue regular meds  Explained sodium and need for watching diet  Lima Orourke received counseling on the following healthy behaviors: medication adherence  Reviewed prior labs and health maintenance. Continue current medications, diet and exercise. Discussed use, benefit, and side effects of prescribed medications. Barriers to medication compliance addressed. Patient given educational materials - see patient instructions. All patient questions answered. Patient voiced understanding.

## 2017-11-27 NOTE — TELEPHONE ENCOUNTER
One touch ultra test strips - BID    RA-ching    [facility] Maintenance   Topic Date Due    Hepatitis C screen  1955    HIV screen  05/27/1971    DTaP/Tdap/Td vaccine (1 - Tdap) 05/27/1975    Diabetic microalbuminuria test  04/04/2018    Diabetic foot exam  06/05/2018    Diabetic retinal exam  09/22/2018    Diabetic hemoglobin A1C test  10/05/2018    Lipid screen  10/05/2018    Colon cancer screen colonoscopy  01/07/2024    Zostavax vaccine  Completed    Flu vaccine  Completed    Pneumococcal med risk  Completed             (applicable per patient's age: Cancer Screenings, Depression Screening, Fall Risk Screening, Immunizations)    Hemoglobin A1C (%)   Date Value   10/05/2017 5.9   04/04/2017 6.4   10/03/2016 5.9     Microalb/Crt.  Ratio (mcg/mg creat)   Date Value   03/09/2016 10     LDL Cholesterol (mg/dL)   Date Value   10/05/2017 64     AST (U/L)   Date Value   10/22/2017 19     ALT (U/L)   Date Value   10/22/2017 17     BUN (mg/dL)   Date Value   10/22/2017 17      (goal A1C is < 7)   (goal LDL is <100) need 30-50% reduction from baseline     BP Readings from Last 3 Encounters:   10/26/17 110/72   10/22/17 135/75   10/05/17 110/72    (goal /80)      All Future Testing planned in [facility]:  Lab Frequency Next Occurrence       Next Visit Date:  Future Appointments  Date Time Provider Daniela Canales   4/5/2018 11:00 AM DO Riaz Barrios [facility]   6/21/2018 2:00 PM Libra Vail MD Will Urol W            Patient Active Problem List:     Type 2 diabetes mellitus ([address]     Essential hypertension, benign     Hematuria     Lower urinary tract symptoms (LUTS)     BPH (benign prostatic hyperplasia)     Microscopic hematuria     Yeast dermatitis of penis     Blood in stool     Scrotal irritation     Generalized weakness     Benign non-nodular prostatic hyperplasia with lower urinary tract symptoms     Urinary retention

## 2018-01-20 ENCOUNTER — APPOINTMENT (OUTPATIENT)
Dept: GENERAL RADIOLOGY | Age: 63
DRG: 558 | End: 2018-01-20
Payer: MEDICARE

## 2018-01-20 ENCOUNTER — APPOINTMENT (OUTPATIENT)
Dept: CT IMAGING | Age: 63
DRG: 558 | End: 2018-01-20
Payer: MEDICARE

## 2018-01-20 ENCOUNTER — HOSPITAL ENCOUNTER (INPATIENT)
Age: 63
LOS: 3 days | Discharge: HOME | DRG: 558 | End: 2018-01-23
Attending: FAMILY MEDICINE | Admitting: INTERNAL MEDICINE
Payer: MEDICARE

## 2018-01-20 DIAGNOSIS — R53.1 GENERALIZED WEAKNESS: ICD-10-CM

## 2018-01-20 DIAGNOSIS — T79.6XXA TRAUMATIC RHABDOMYOLYSIS, INITIAL ENCOUNTER (HCC): Primary | ICD-10-CM

## 2018-01-20 PROBLEM — M62.82 RHABDOMYOLYSIS: Status: ACTIVE | Noted: 2018-01-20

## 2018-01-20 LAB
-: NORMAL
ABSOLUTE EOS #: 0 K/UL (ref 0–0.4)
ABSOLUTE IMMATURE GRANULOCYTE: ABNORMAL K/UL (ref 0–0.3)
ABSOLUTE LYMPH #: 0.9 K/UL (ref 1–4.8)
ABSOLUTE MONO #: 0.8 K/UL (ref 0–1)
ALBUMIN SERPL-MCNC: 4 G/DL (ref 3.5–5.2)
ALBUMIN/GLOBULIN RATIO: ABNORMAL (ref 1–2.5)
ALP BLD-CCNC: 51 U/L (ref 40–129)
ALT SERPL-CCNC: 18 U/L (ref 5–41)
AMORPHOUS: NORMAL
ANION GAP SERPL CALCULATED.3IONS-SCNC: 11 MMOL/L (ref 9–17)
AST SERPL-CCNC: 33 U/L
BACTERIA: NORMAL
BASOPHILS # BLD: 0 % (ref 0–2)
BASOPHILS ABSOLUTE: 0 K/UL (ref 0–0.2)
BILIRUB SERPL-MCNC: 0.54 MG/DL (ref 0.3–1.2)
BILIRUBIN URINE: NEGATIVE
BUN BLDV-MCNC: 20 MG/DL (ref 8–23)
BUN/CREAT BLD: 22 (ref 9–20)
CALCIUM SERPL-MCNC: 9.5 MG/DL (ref 8.6–10.4)
CASTS UA: NORMAL /LPF
CHLORIDE BLD-SCNC: 102 MMOL/L (ref 98–107)
CO2: 27 MMOL/L (ref 20–31)
COLOR: YELLOW
COMMENT UA: ABNORMAL
CREAT SERPL-MCNC: 0.89 MG/DL (ref 0.7–1.2)
CRYSTALS, UA: NORMAL /HPF
DIFFERENTIAL TYPE: YES
EKG ATRIAL RATE: 49 BPM
EKG P AXIS: 33 DEGREES
EKG P-R INTERVAL: 154 MS
EKG Q-T INTERVAL: 466 MS
EKG QRS DURATION: 138 MS
EKG QTC CALCULATION (BAZETT): 420 MS
EKG R AXIS: 98 DEGREES
EKG T AXIS: 42 DEGREES
EKG VENTRICULAR RATE: 49 BPM
EOSINOPHILS RELATIVE PERCENT: 0 % (ref 0–5)
EPITHELIAL CELLS UA: NORMAL /HPF
ESTIMATED AVERAGE GLUCOSE: 117 MG/DL
GFR AFRICAN AMERICAN: >60 ML/MIN
GFR NON-AFRICAN AMERICAN: >60 ML/MIN
GFR SERPL CREATININE-BSD FRML MDRD: ABNORMAL ML/MIN/{1.73_M2}
GFR SERPL CREATININE-BSD FRML MDRD: ABNORMAL ML/MIN/{1.73_M2}
GLUCOSE BLD-MCNC: 116 MG/DL (ref 70–99)
GLUCOSE URINE: NEGATIVE
HBA1C MFR BLD: 5.7 % (ref 4.8–5.9)
HCT VFR BLD CALC: 40.8 % (ref 41–53)
HEMOGLOBIN: 13.4 G/DL (ref 13.5–17.5)
IMMATURE GRANULOCYTES: ABNORMAL %
INR BLD: 1
KETONES, URINE: ABNORMAL
LEUKOCYTE ESTERASE, URINE: NEGATIVE
LYMPHOCYTES # BLD: 9 % (ref 13–44)
MCH RBC QN AUTO: 30.4 PG (ref 26–34)
MCHC RBC AUTO-ENTMCNC: 32.9 G/DL (ref 31–37)
MCV RBC AUTO: 92.6 FL (ref 80–100)
MONOCYTES # BLD: 8 % (ref 5–9)
MUCUS: NORMAL
MYOGLOBIN: 2249 NG/ML (ref 28–72)
NITRITE, URINE: NEGATIVE
NRBC AUTOMATED: ABNORMAL PER 100 WBC
OTHER OBSERVATIONS UA: NORMAL
PARTIAL THROMBOPLASTIN TIME: 26.9 SEC (ref 21–33)
PDW BLD-RTO: 14.9 % (ref 12.1–15.2)
PH UA: 6 (ref 5–8)
PLATELET # BLD: 142 K/UL (ref 140–450)
PLATELET ESTIMATE: ABNORMAL
PMV BLD AUTO: ABNORMAL FL (ref 6–12)
POTASSIUM SERPL-SCNC: 3.7 MMOL/L (ref 3.7–5.3)
PROTEIN UA: NEGATIVE
PROTHROMBIN TIME: 10.5 SEC (ref 9–11.6)
RBC # BLD: 4.41 M/UL (ref 4.5–5.9)
RBC # BLD: ABNORMAL 10*6/UL
RBC UA: NORMAL /HPF (ref 0–2)
RENAL EPITHELIAL, UA: NORMAL /HPF
SEG NEUTROPHILS: 83 % (ref 39–75)
SEGMENTED NEUTROPHILS ABSOLUTE COUNT: 8.4 K/UL (ref 2.1–6.5)
SODIUM BLD-SCNC: 140 MMOL/L (ref 135–144)
SPECIFIC GRAVITY UA: 1.02 (ref 1–1.03)
TOTAL CK: 1456 U/L (ref 39–308)
TOTAL PROTEIN: 6.7 G/DL (ref 6.4–8.3)
TRICHOMONAS: NORMAL
TROPONIN INTERP: NORMAL
TROPONIN T: <0.03 NG/ML
TURBIDITY: CLEAR
URINE HGB: ABNORMAL
UROBILINOGEN, URINE: NORMAL
WBC # BLD: 10.2 K/UL (ref 3.5–11)
WBC # BLD: ABNORMAL 10*3/UL
WBC UA: NORMAL /HPF
YEAST: NORMAL

## 2018-01-20 PROCEDURE — 82947 ASSAY GLUCOSE BLOOD QUANT: CPT

## 2018-01-20 PROCEDURE — 84484 ASSAY OF TROPONIN QUANT: CPT

## 2018-01-20 PROCEDURE — 51702 INSERT TEMP BLADDER CATH: CPT

## 2018-01-20 PROCEDURE — 93005 ELECTROCARDIOGRAM TRACING: CPT

## 2018-01-20 PROCEDURE — 99285 EMERGENCY DEPT VISIT HI MDM: CPT

## 2018-01-20 PROCEDURE — 1200000000 HC SEMI PRIVATE

## 2018-01-20 PROCEDURE — 2580000003 HC RX 258: Performed by: INTERNAL MEDICINE

## 2018-01-20 PROCEDURE — 70450 CT HEAD/BRAIN W/O DYE: CPT

## 2018-01-20 PROCEDURE — 6370000000 HC RX 637 (ALT 250 FOR IP): Performed by: INTERNAL MEDICINE

## 2018-01-20 PROCEDURE — 85025 COMPLETE CBC W/AUTO DIFF WBC: CPT

## 2018-01-20 PROCEDURE — 36415 COLL VENOUS BLD VENIPUNCTURE: CPT

## 2018-01-20 PROCEDURE — 85610 PROTHROMBIN TIME: CPT

## 2018-01-20 PROCEDURE — 83874 ASSAY OF MYOGLOBIN: CPT

## 2018-01-20 PROCEDURE — 72125 CT NECK SPINE W/O DYE: CPT

## 2018-01-20 PROCEDURE — 81001 URINALYSIS AUTO W/SCOPE: CPT

## 2018-01-20 PROCEDURE — 82550 ASSAY OF CK (CPK): CPT

## 2018-01-20 PROCEDURE — 71045 X-RAY EXAM CHEST 1 VIEW: CPT

## 2018-01-20 PROCEDURE — 2580000003 HC RX 258: Performed by: FAMILY MEDICINE

## 2018-01-20 PROCEDURE — 85730 THROMBOPLASTIN TIME PARTIAL: CPT

## 2018-01-20 PROCEDURE — 80053 COMPREHEN METABOLIC PANEL: CPT

## 2018-01-20 PROCEDURE — 83036 HEMOGLOBIN GLYCOSYLATED A1C: CPT

## 2018-01-20 RX ORDER — TAMSULOSIN HYDROCHLORIDE 0.4 MG/1
0.4 CAPSULE ORAL EVERY EVENING
Status: DISCONTINUED | OUTPATIENT
Start: 2018-01-20 | End: 2018-01-23 | Stop reason: HOSPADM

## 2018-01-20 RX ORDER — RISPERIDONE 2 MG/1
2 TABLET, FILM COATED ORAL NIGHTLY
Status: DISCONTINUED | OUTPATIENT
Start: 2018-01-20 | End: 2018-01-23 | Stop reason: HOSPADM

## 2018-01-20 RX ORDER — NICOTINE POLACRILEX 4 MG
15 LOZENGE BUCCAL PRN
Status: DISCONTINUED | OUTPATIENT
Start: 2018-01-20 | End: 2018-01-23 | Stop reason: HOSPADM

## 2018-01-20 RX ORDER — CLONAZEPAM 0.5 MG/1
1 TABLET ORAL 2 TIMES DAILY
Status: DISCONTINUED | OUTPATIENT
Start: 2018-01-21 | End: 2018-01-23 | Stop reason: HOSPADM

## 2018-01-20 RX ORDER — 0.9 % SODIUM CHLORIDE 0.9 %
1000 INTRAVENOUS SOLUTION INTRAVENOUS ONCE
Status: COMPLETED | OUTPATIENT
Start: 2018-01-20 | End: 2018-01-20

## 2018-01-20 RX ORDER — BENZTROPINE MESYLATE 0.5 MG/1
0.5 TABLET ORAL 2 TIMES DAILY
Status: DISCONTINUED | OUTPATIENT
Start: 2018-01-20 | End: 2018-01-23 | Stop reason: HOSPADM

## 2018-01-20 RX ORDER — GABAPENTIN 300 MG/1
300 CAPSULE ORAL DAILY
Status: DISCONTINUED | OUTPATIENT
Start: 2018-01-21 | End: 2018-01-23 | Stop reason: HOSPADM

## 2018-01-20 RX ORDER — DIVALPROEX SODIUM 250 MG/1
500 TABLET, EXTENDED RELEASE ORAL 2 TIMES DAILY
Status: DISCONTINUED | OUTPATIENT
Start: 2018-01-20 | End: 2018-01-21

## 2018-01-20 RX ORDER — ONDANSETRON 2 MG/ML
4 INJECTION INTRAMUSCULAR; INTRAVENOUS EVERY 6 HOURS PRN
Status: DISCONTINUED | OUTPATIENT
Start: 2018-01-20 | End: 2018-01-23 | Stop reason: HOSPADM

## 2018-01-20 RX ORDER — PRIMIDONE 50 MG/1
25 TABLET ORAL NIGHTLY
Status: DISCONTINUED | OUTPATIENT
Start: 2018-01-20 | End: 2018-01-23 | Stop reason: HOSPADM

## 2018-01-20 RX ORDER — PROPRANOLOL HYDROCHLORIDE 10 MG/1
40 TABLET ORAL 2 TIMES DAILY
Status: DISCONTINUED | OUTPATIENT
Start: 2018-01-20 | End: 2018-01-23 | Stop reason: HOSPADM

## 2018-01-20 RX ORDER — SODIUM CHLORIDE 9 MG/ML
INJECTION, SOLUTION INTRAVENOUS CONTINUOUS
Status: DISCONTINUED | OUTPATIENT
Start: 2018-01-20 | End: 2018-01-21

## 2018-01-20 RX ORDER — DEXTROSE MONOHYDRATE 50 MG/ML
100 INJECTION, SOLUTION INTRAVENOUS PRN
Status: DISCONTINUED | OUTPATIENT
Start: 2018-01-20 | End: 2018-01-23 | Stop reason: HOSPADM

## 2018-01-20 RX ORDER — SODIUM CHLORIDE 9 MG/ML
INJECTION, SOLUTION INTRAVENOUS CONTINUOUS
Status: DISCONTINUED | OUTPATIENT
Start: 2018-01-20 | End: 2018-01-23 | Stop reason: HOSPADM

## 2018-01-20 RX ORDER — SODIUM CHLORIDE 0.9 % (FLUSH) 0.9 %
10 SYRINGE (ML) INJECTION EVERY 12 HOURS SCHEDULED
Status: DISCONTINUED | OUTPATIENT
Start: 2018-01-20 | End: 2018-01-23 | Stop reason: HOSPADM

## 2018-01-20 RX ORDER — FINASTERIDE 5 MG/1
5 TABLET, FILM COATED ORAL DAILY
Status: DISCONTINUED | OUTPATIENT
Start: 2018-01-21 | End: 2018-01-23 | Stop reason: HOSPADM

## 2018-01-20 RX ORDER — ACETAMINOPHEN 325 MG/1
650 TABLET ORAL EVERY 4 HOURS PRN
Status: DISCONTINUED | OUTPATIENT
Start: 2018-01-20 | End: 2018-01-23 | Stop reason: HOSPADM

## 2018-01-20 RX ORDER — RISPERIDONE 0.5 MG/1
1 TABLET, FILM COATED ORAL DAILY
Status: DISCONTINUED | OUTPATIENT
Start: 2018-01-21 | End: 2018-01-23 | Stop reason: HOSPADM

## 2018-01-20 RX ORDER — DEXTROSE MONOHYDRATE 25 G/50ML
12.5 INJECTION, SOLUTION INTRAVENOUS PRN
Status: DISCONTINUED | OUTPATIENT
Start: 2018-01-20 | End: 2018-01-23 | Stop reason: HOSPADM

## 2018-01-20 RX ORDER — SODIUM CHLORIDE 0.9 % (FLUSH) 0.9 %
10 SYRINGE (ML) INJECTION PRN
Status: DISCONTINUED | OUTPATIENT
Start: 2018-01-20 | End: 2018-01-23 | Stop reason: HOSPADM

## 2018-01-20 RX ADMIN — RISPERIDONE 2 MG: 0.5 TABLET, FILM COATED ORAL at 23:09

## 2018-01-20 RX ADMIN — SODIUM CHLORIDE: 9 INJECTION, SOLUTION INTRAVENOUS at 17:51

## 2018-01-20 RX ADMIN — BENZTROPINE MESYLATE 0.5 MG: 0.5 TABLET ORAL at 23:10

## 2018-01-20 RX ADMIN — PRIMIDONE 25 MG: 50 TABLET ORAL at 23:13

## 2018-01-20 RX ADMIN — SODIUM CHLORIDE: 9 INJECTION, SOLUTION INTRAVENOUS at 21:22

## 2018-01-20 RX ADMIN — SODIUM CHLORIDE 1000 ML: 9 INJECTION, SOLUTION INTRAVENOUS at 20:09

## 2018-01-20 RX ADMIN — TAMSULOSIN HYDROCHLORIDE 0.4 MG: 0.4 CAPSULE ORAL at 23:10

## 2018-01-20 RX ADMIN — PROPRANOLOL HYDROCHLORIDE 40 MG: 10 TABLET ORAL at 23:10

## 2018-01-20 RX ADMIN — SODIUM CHLORIDE: 9 INJECTION, SOLUTION INTRAVENOUS at 23:01

## 2018-01-20 ASSESSMENT — PAIN SCALES - GENERAL: PAINLEVEL_OUTOF10: 0

## 2018-01-21 LAB
ANION GAP SERPL CALCULATED.3IONS-SCNC: 11 MMOL/L (ref 9–17)
BUN BLDV-MCNC: 14 MG/DL (ref 8–23)
BUN/CREAT BLD: 18 (ref 9–20)
CALCIUM SERPL-MCNC: 8.9 MG/DL (ref 8.6–10.4)
CHLORIDE BLD-SCNC: 107 MMOL/L (ref 98–107)
CO2: 25 MMOL/L (ref 20–31)
CREAT SERPL-MCNC: 0.77 MG/DL (ref 0.7–1.2)
GFR AFRICAN AMERICAN: >60 ML/MIN
GFR NON-AFRICAN AMERICAN: >60 ML/MIN
GFR SERPL CREATININE-BSD FRML MDRD: NORMAL ML/MIN/{1.73_M2}
GFR SERPL CREATININE-BSD FRML MDRD: NORMAL ML/MIN/{1.73_M2}
GLUCOSE BLD-MCNC: 103 MG/DL (ref 65–99)
GLUCOSE BLD-MCNC: 132 MG/DL (ref 65–99)
GLUCOSE BLD-MCNC: 140 MG/DL (ref 65–99)
GLUCOSE BLD-MCNC: 97 MG/DL (ref 70–99)
GLUCOSE BLD-MCNC: 98 MG/DL (ref 65–99)
MYOGLOBIN: 585 NG/ML (ref 28–72)
POTASSIUM SERPL-SCNC: 3.8 MMOL/L (ref 3.7–5.3)
SODIUM BLD-SCNC: 143 MMOL/L (ref 135–144)

## 2018-01-21 PROCEDURE — 97162 PT EVAL MOD COMPLEX 30 MIN: CPT

## 2018-01-21 PROCEDURE — 2580000003 HC RX 258: Performed by: INTERNAL MEDICINE

## 2018-01-21 PROCEDURE — 6370000000 HC RX 637 (ALT 250 FOR IP): Performed by: INTERNAL MEDICINE

## 2018-01-21 PROCEDURE — 6360000002 HC RX W HCPCS: Performed by: INTERNAL MEDICINE

## 2018-01-21 PROCEDURE — 36415 COLL VENOUS BLD VENIPUNCTURE: CPT

## 2018-01-21 PROCEDURE — 94761 N-INVAS EAR/PLS OXIMETRY MLT: CPT

## 2018-01-21 PROCEDURE — 80048 BASIC METABOLIC PNL TOTAL CA: CPT

## 2018-01-21 PROCEDURE — 97166 OT EVAL MOD COMPLEX 45 MIN: CPT

## 2018-01-21 PROCEDURE — 83874 ASSAY OF MYOGLOBIN: CPT

## 2018-01-21 PROCEDURE — 1200000000 HC SEMI PRIVATE

## 2018-01-21 PROCEDURE — 82947 ASSAY GLUCOSE BLOOD QUANT: CPT

## 2018-01-21 RX ORDER — TRAMADOL HYDROCHLORIDE 50 MG/1
50 TABLET ORAL 2 TIMES DAILY PRN
Status: DISCONTINUED | OUTPATIENT
Start: 2018-01-21 | End: 2018-01-21

## 2018-01-21 RX ORDER — DIVALPROEX SODIUM 250 MG/1
500 TABLET, EXTENDED RELEASE ORAL DAILY
Status: DISCONTINUED | OUTPATIENT
Start: 2018-01-21 | End: 2018-01-23 | Stop reason: HOSPADM

## 2018-01-21 RX ADMIN — PROPRANOLOL HYDROCHLORIDE 40 MG: 10 TABLET ORAL at 21:28

## 2018-01-21 RX ADMIN — RISPERIDONE 1 MG: 0.5 TABLET, FILM COATED ORAL at 08:40

## 2018-01-21 RX ADMIN — RISPERIDONE 2 MG: 0.5 TABLET, FILM COATED ORAL at 21:29

## 2018-01-21 RX ADMIN — PROPRANOLOL HYDROCHLORIDE 40 MG: 10 TABLET ORAL at 08:39

## 2018-01-21 RX ADMIN — INSULIN LISPRO 1 UNITS: 100 INJECTION, SOLUTION INTRAVENOUS; SUBCUTANEOUS at 08:56

## 2018-01-21 RX ADMIN — CLONAZEPAM 1 MG: 0.5 TABLET ORAL at 08:44

## 2018-01-21 RX ADMIN — ENOXAPARIN SODIUM 40 MG: 40 INJECTION SUBCUTANEOUS at 08:40

## 2018-01-21 RX ADMIN — FINASTERIDE 5 MG: 5 TABLET, FILM COATED ORAL at 08:40

## 2018-01-21 RX ADMIN — SODIUM CHLORIDE: 9 INJECTION, SOLUTION INTRAVENOUS at 17:01

## 2018-01-21 RX ADMIN — CLONAZEPAM 1 MG: 0.5 TABLET ORAL at 18:36

## 2018-01-21 RX ADMIN — BENZTROPINE MESYLATE 0.5 MG: 0.5 TABLET ORAL at 08:43

## 2018-01-21 RX ADMIN — SODIUM CHLORIDE: 9 INJECTION, SOLUTION INTRAVENOUS at 06:09

## 2018-01-21 RX ADMIN — DIVALPROEX SODIUM 500 MG: 250 TABLET, EXTENDED RELEASE ORAL at 08:44

## 2018-01-21 RX ADMIN — BENZTROPINE MESYLATE 0.5 MG: 0.5 TABLET ORAL at 21:28

## 2018-01-21 RX ADMIN — PRIMIDONE 25 MG: 50 TABLET ORAL at 21:28

## 2018-01-21 RX ADMIN — GABAPENTIN 300 MG: 300 CAPSULE ORAL at 08:40

## 2018-01-21 RX ADMIN — TAMSULOSIN HYDROCHLORIDE 0.4 MG: 0.4 CAPSULE ORAL at 18:36

## 2018-01-21 ASSESSMENT — PAIN SCALES - GENERAL: PAINLEVEL_OUTOF10: 0

## 2018-01-21 NOTE — H&P
Bindu, \"patient taking 1/2 tablet (25mg) nightly. \"    Historical Provider, MD   risperiDONE (RISPERDAL) 2 MG tablet Take 2 mg by mouth daily  10/12/15   Historical Provider, MD   clonazePAM (KLONOPIN) 1 MG tablet Take 1 tablet by mouth 2 times daily. 11/24/14   Ambrosio Barron, DO       Allergies:  Review of patient's allergies indicates no known allergies. Social History:   TOBACCO:   reports that he has never smoked. He has never used smokeless tobacco.  ETOH:   reports that he does not drink alcohol. OCCUPATION:      Family History:       Problem Relation Age of Onset    Diabetes Father     Diabetes Sister     Diabetes Brother     Diabetes Sister     Arthritis Mother        REVIEW OF SYSTEMS:  Constitutional:  positive for  weakness  negative for  fevers, chills and malaise  HEENT:  positive for  Bruise over his left eye which he says is sore. He is not sure what he hit his eye on.  negative for  earaches, nasal congestion and sore throat  Neck:  No lumps or masses  Cardiac:  negative for  chest pain, dyspnea, palpitations  Respiratory:  negative for  dry cough, cough with sputum, dyspnea and wheezing  Gastrointestinal:  negative for nausea, vomiting and abdominal pain  :  negative for frequency, dysuria and hesitancy  Musculoskeletal:  positive for  arthralgias and muscle weakness  Neuro:  negative      Physical Exam:    Vitals: BP 96/60   Pulse 68   Temp 98.2 °F (36.8 °C) (Oral)   Resp 18   Ht 6' 3\" (1.905 m)   Wt 188 lb (85.3 kg)   SpO2 98%   BMI 23.50 kg/m²   General appearance: alert, appears stated age and cooperative  Skin: Skin color, texture, turgor normal. No rashes or lesions  HEENT: Head: Normocephalic, no lesions, without obvious abnormality. Eye: Normal external eye, conjunctiva, lids cornea, JOHN. Ears: Normal TM's bilaterally. Normal auditory canals and external ears. Non-tender. Nose: Normal external nose, mucus membranes and septum. Pharynx: Dental Hygiene adequate.  Normal Full code. Medical Necessity: In patient is appropriate for this patient secondary to the need for IV hydration with close monitoring of labs / renal function. Patient to start therapy secondary to weakness and fall risk at this time. Estimated length of stay 2 days. The beneficiary may reasonably be expected to be discharged or transferred to a hospital within 96 hours after admission.       Patient Active Problem List   Diagnosis Code    Type 2 diabetes mellitus (HCC) E11.9    Essential hypertension, benign I10    Hematuria R31.9    Lower urinary tract symptoms (LUTS) R39.9    BPH (benign prostatic hyperplasia) N40.0    Microscopic hematuria R31.29    Yeast dermatitis of penis B37.49    Blood in stool K92.1    Scrotal irritation N50.89    Non-traumatic rhabdomyolysis M62.82    Generalized weakness R53.1    Benign non-nodular prostatic hyperplasia with lower urinary tract symptoms N40.1    Urinary retention R33.9    Rhabdomyolysis M62.82       Padmini Dominguez MD  Admitting Hospitalist

## 2018-01-21 NOTE — ED PROVIDER NOTES
[address] [facility]  [phone]              Final Impression:   1.  Traumatic rhabdomyolysis, initial encounter [facility])               (Please note that portions of this note were completed with a voice recognition program.  Efforts were made to edit the dictations but occasionally words are mis-transcribed.)       Leonora Guzman MD  01/21/18 0492

## 2018-01-21 NOTE — PROGRESS NOTES
[facility]  Physical Therapy  Evaluation  Date: 2018  Patient Name: Mackenzie Kam        MRN: [id number]    : 1955  (65 y.o.)  Gender: male   Referring Practitioner: Dr. Bashir Balloon Back  Diagnosis: Rhabdomyolysis  Additional Pertinent Hx: Pt fell at home on 18, pt layed on the floor until found by a friend on 18. Pt admit via ER on 18 due to s/p fall with dehydration.    Past Medical History:   Diagnosis Date    Chronic schizophrenic ([facility]     Hyperlipidemia     Hypertension     Neuromuscular disorder ([facility]     Type II or unspecified type diabetes mellitus without mention of complication, not stated as uncontrolled      Past Surgical History:   Procedure Laterality Date    COLONOSCOPY      COLONOSCOPY  14    colon polyp    FOOT SURGERY      right foot    HEMORRHOID SURGERY       Subjective  Pain Level: 0    Orientation  Overall Orientation Status: Within Normal Limits    Home Living  Type of Home: Apartment  Home Layout: One level (lives on 2nd floor of apartment)  Home Access: Elevator  Home Equipment: Quad cane    Prior Level of Function  Prior Function  Lives With: Alone  ADL Assistance: Independent  Homemaking Assistance: Independent  Ambulation Assistance: Independent  Transfer Assistance: Independent    Objective  RUE AROM (degrees)  RUE AROM : Exceptions  R Shoulder Flexion 0-180: 0-90 (Patient reports he hurt his shoulder at home several months ago and has been unable to raise the R shoulder above his head)  LUE AROM (degrees)  LUE AROM : WNL  Strength RLE  Strength RLE: WFL  Comment: 3+/5 Hip Flexion, Knee ext=4/5  Strength LLE  Comment: 3+/5 Hip Flexion, Knee ext=4/5     Scooting: Supervision  Sit to Stand: Minimal Assistance (+1 from chair, CGA from toilet w/GB, Supervision from bed)  Stand to sit: Contact guard assistance  Bed to Chair: Contact guard assistance, Stand by assistance     Ambulation 1  Surface: level tile  Device: [employer] sit independently to ensure pt can safely exit his bed at home.     Time In: 7246  Time Out: 0935  Timed Coded Minutes: 30  Total Treatment Time: Hamlet Rao PT 1/21/2018

## 2018-01-21 NOTE — PLAN OF CARE
Problem: Pain:  Goal: Patient's pain/discomfort is manageable  Patient's pain/discomfort is manageable   Outcome: Met This Shift  Has no pain at this time.

## 2018-01-21 NOTE — PLAN OF CARE
[facility]  Inpatient Occupational Therapy  Plan of Care  OT Orders Received and Evaluation Complete  Date: 2018  Patient Name: Zenobia Grande        MRN: [id number]    : 1955  (65 y.o.)  Referring Practitioner: Dr. Isabella Gutierrez   Onset Date: 18  Referral Date: 18     Treatment Diagnosis: weakness    Identified Problem Areas  Performance deficits / Impairments: Decreased functional mobility , Decreased high-level IADLs, Decreased balance, Decreased ADL status, Decreased ROM  Assessment: Upon arrival patient is seated in bedside chair, requires min assist x1 for sit to stand from low chair. Completed sit to stand with supervision only from bedside. Patient ambulates into bathroom, completing toileting task and hygiene with CGA. Patient ambulated into the hallway with CGA and use of quad cane. Patient states \"Im walking a lot better, maybe I can go home. \"  Patient reports he is near baseline, but has been having some trouble wiht sit to stand at home prior to this admission. Remains supine in bed with call light in reach.    Prognosis: Good     Justification for Skilled Services:  [x]  Complete Daily Tasks Safely  [x] Improve Balance   [x]  Return to Prior Level of Function  [x]  Family/Caregiver Education    [x] Improve UE strength  [x] Patient Education: [x]Adaptive Equipment   [x]Home Exercise Program and Progression    Treatment Plan  Plan  Times per week: 5x  Times per day: Daily (1-2x day)  Plan weeks: 3 days   [] Modalities:  [x] Therapeutic Exercise   [x] Therapeutic Activity  [] Splinting:     [] Home Safety Evaluation         [x] ADL Retraining                       [] Muscle Re-education [] Cognitive Retraining            [] Sensory Integration  [x] Patient Education [x] Home Exercise Program [] Fine Motor Coordination       GOALS  Short term goals  Time Frame for Short term goals: 3 days (POC expires 18)  Short term goal 1: Patient to be modified independent with UB/LB

## 2018-01-22 PROBLEM — E44.1 MILD MALNUTRITION (HCC): Status: ACTIVE | Noted: 2018-01-22

## 2018-01-22 LAB
ANION GAP SERPL CALCULATED.3IONS-SCNC: 12 MMOL/L (ref 9–17)
BUN BLDV-MCNC: 11 MG/DL (ref 8–23)
BUN/CREAT BLD: 13 (ref 9–20)
CALCIUM SERPL-MCNC: 8.6 MG/DL (ref 8.6–10.4)
CHLORIDE BLD-SCNC: 107 MMOL/L (ref 98–107)
CO2: 23 MMOL/L (ref 20–31)
CREAT SERPL-MCNC: 0.83 MG/DL (ref 0.7–1.2)
GFR AFRICAN AMERICAN: >60 ML/MIN
GFR NON-AFRICAN AMERICAN: >60 ML/MIN
GFR SERPL CREATININE-BSD FRML MDRD: ABNORMAL ML/MIN/{1.73_M2}
GFR SERPL CREATININE-BSD FRML MDRD: ABNORMAL ML/MIN/{1.73_M2}
GLUCOSE BLD-MCNC: 100 MG/DL (ref 65–99)
GLUCOSE BLD-MCNC: 102 MG/DL (ref 70–99)
GLUCOSE BLD-MCNC: 124 MG/DL (ref 65–99)
GLUCOSE BLD-MCNC: 98 MG/DL (ref 65–99)
MYOGLOBIN: 112 NG/ML (ref 28–72)
POTASSIUM SERPL-SCNC: 3.7 MMOL/L (ref 3.7–5.3)
SODIUM BLD-SCNC: 142 MMOL/L (ref 135–144)

## 2018-01-22 PROCEDURE — 2580000003 HC RX 258: Performed by: INTERNAL MEDICINE

## 2018-01-22 PROCEDURE — 94761 N-INVAS EAR/PLS OXIMETRY MLT: CPT

## 2018-01-22 PROCEDURE — 80048 BASIC METABOLIC PNL TOTAL CA: CPT

## 2018-01-22 PROCEDURE — 97530 THERAPEUTIC ACTIVITIES: CPT

## 2018-01-22 PROCEDURE — 1200000000 HC SEMI PRIVATE

## 2018-01-22 PROCEDURE — 97535 SELF CARE MNGMENT TRAINING: CPT

## 2018-01-22 PROCEDURE — 97116 GAIT TRAINING THERAPY: CPT

## 2018-01-22 PROCEDURE — 82947 ASSAY GLUCOSE BLOOD QUANT: CPT

## 2018-01-22 PROCEDURE — 97110 THERAPEUTIC EXERCISES: CPT

## 2018-01-22 PROCEDURE — 6370000000 HC RX 637 (ALT 250 FOR IP): Performed by: INTERNAL MEDICINE

## 2018-01-22 PROCEDURE — 83874 ASSAY OF MYOGLOBIN: CPT

## 2018-01-22 PROCEDURE — 36415 COLL VENOUS BLD VENIPUNCTURE: CPT

## 2018-01-22 PROCEDURE — 6360000002 HC RX W HCPCS: Performed by: INTERNAL MEDICINE

## 2018-01-22 RX ADMIN — RISPERIDONE 1 MG: 0.5 TABLET, FILM COATED ORAL at 08:00

## 2018-01-22 RX ADMIN — BENZTROPINE MESYLATE 0.5 MG: 0.5 TABLET ORAL at 20:31

## 2018-01-22 RX ADMIN — PRIMIDONE 25 MG: 50 TABLET ORAL at 20:31

## 2018-01-22 RX ADMIN — PROPRANOLOL HYDROCHLORIDE 40 MG: 10 TABLET ORAL at 08:01

## 2018-01-22 RX ADMIN — CLONAZEPAM 1 MG: 0.5 TABLET ORAL at 17:02

## 2018-01-22 RX ADMIN — SODIUM CHLORIDE: 9 INJECTION, SOLUTION INTRAVENOUS at 03:00

## 2018-01-22 RX ADMIN — CLONAZEPAM 1 MG: 0.5 TABLET ORAL at 08:26

## 2018-01-22 RX ADMIN — TAMSULOSIN HYDROCHLORIDE 0.4 MG: 0.4 CAPSULE ORAL at 17:03

## 2018-01-22 RX ADMIN — GABAPENTIN 300 MG: 300 CAPSULE ORAL at 08:01

## 2018-01-22 RX ADMIN — PROPRANOLOL HYDROCHLORIDE 40 MG: 10 TABLET ORAL at 20:30

## 2018-01-22 RX ADMIN — SODIUM CHLORIDE: 9 INJECTION, SOLUTION INTRAVENOUS at 17:07

## 2018-01-22 RX ADMIN — FINASTERIDE 5 MG: 5 TABLET, FILM COATED ORAL at 08:01

## 2018-01-22 RX ADMIN — RISPERIDONE 2 MG: 0.5 TABLET, FILM COATED ORAL at 20:30

## 2018-01-22 RX ADMIN — DIVALPROEX SODIUM 500 MG: 250 TABLET, EXTENDED RELEASE ORAL at 08:30

## 2018-01-22 RX ADMIN — BENZTROPINE MESYLATE 0.5 MG: 0.5 TABLET ORAL at 08:27

## 2018-01-22 RX ADMIN — ENOXAPARIN SODIUM 40 MG: 40 INJECTION SUBCUTANEOUS at 08:01

## 2018-01-22 NOTE — PROGRESS NOTES
FSBS 98. Pt takes vanilla ice cream and yeni crackers. Pt able to feed himself. No swallowing deficits noted.

## 2018-01-22 NOTE — PROGRESS NOTES
Pt awake for assessment. LCTA. BS are present. Left eye remains black and blue and edematous. Right chest is less red than previous night. RLQ remains red with small intact blister. Pt denies pain. Aces are on. IV to LAC infusing without difficulties. Pt uses call light appropriately. No further needs voiced.

## 2018-01-23 VITALS
OXYGEN SATURATION: 94 % | TEMPERATURE: 97.9 F | DIASTOLIC BLOOD PRESSURE: 66 MMHG | RESPIRATION RATE: 20 BRPM | BODY MASS INDEX: 23.38 KG/M2 | HEIGHT: 75 IN | SYSTOLIC BLOOD PRESSURE: 106 MMHG | HEART RATE: 62 BPM | WEIGHT: 188 LBS

## 2018-01-23 LAB
ANION GAP SERPL CALCULATED.3IONS-SCNC: 10 MMOL/L (ref 9–17)
BUN BLDV-MCNC: 11 MG/DL (ref 8–23)
BUN/CREAT BLD: 14 (ref 9–20)
CALCIUM SERPL-MCNC: 8.8 MG/DL (ref 8.6–10.4)
CHLORIDE BLD-SCNC: 107 MMOL/L (ref 98–107)
CO2: 26 MMOL/L (ref 20–31)
CREAT SERPL-MCNC: 0.79 MG/DL (ref 0.7–1.2)
GFR AFRICAN AMERICAN: >60 ML/MIN
GFR NON-AFRICAN AMERICAN: >60 ML/MIN
GFR SERPL CREATININE-BSD FRML MDRD: ABNORMAL ML/MIN/{1.73_M2}
GFR SERPL CREATININE-BSD FRML MDRD: ABNORMAL ML/MIN/{1.73_M2}
GLUCOSE BLD-MCNC: 112 MG/DL (ref 65–99)
GLUCOSE BLD-MCNC: 113 MG/DL (ref 70–99)
GLUCOSE BLD-MCNC: 120 MG/DL (ref 65–99)
GLUCOSE BLD-MCNC: 82 MG/DL (ref 65–99)
GLUCOSE BLD-MCNC: 99 MG/DL (ref 65–99)
MYOGLOBIN: 65 NG/ML (ref 28–72)
POTASSIUM SERPL-SCNC: 3.7 MMOL/L (ref 3.7–5.3)
SODIUM BLD-SCNC: 143 MMOL/L (ref 135–144)

## 2018-01-23 PROCEDURE — 6360000002 HC RX W HCPCS: Performed by: INTERNAL MEDICINE

## 2018-01-23 PROCEDURE — 80048 BASIC METABOLIC PNL TOTAL CA: CPT

## 2018-01-23 PROCEDURE — 97535 SELF CARE MNGMENT TRAINING: CPT

## 2018-01-23 PROCEDURE — 83874 ASSAY OF MYOGLOBIN: CPT

## 2018-01-23 PROCEDURE — 94761 N-INVAS EAR/PLS OXIMETRY MLT: CPT

## 2018-01-23 PROCEDURE — 6370000000 HC RX 637 (ALT 250 FOR IP): Performed by: INTERNAL MEDICINE

## 2018-01-23 PROCEDURE — 36415 COLL VENOUS BLD VENIPUNCTURE: CPT

## 2018-01-23 PROCEDURE — 82947 ASSAY GLUCOSE BLOOD QUANT: CPT

## 2018-01-23 RX ADMIN — BENZTROPINE MESYLATE 0.5 MG: 0.5 TABLET ORAL at 08:31

## 2018-01-23 RX ADMIN — RISPERIDONE 1 MG: 0.5 TABLET, FILM COATED ORAL at 08:31

## 2018-01-23 RX ADMIN — ENOXAPARIN SODIUM 40 MG: 40 INJECTION SUBCUTANEOUS at 08:30

## 2018-01-23 RX ADMIN — DIVALPROEX SODIUM 500 MG: 250 TABLET, EXTENDED RELEASE ORAL at 08:30

## 2018-01-23 RX ADMIN — GABAPENTIN 300 MG: 300 CAPSULE ORAL at 08:31

## 2018-01-23 RX ADMIN — PROPRANOLOL HYDROCHLORIDE 40 MG: 10 TABLET ORAL at 08:44

## 2018-01-23 RX ADMIN — CLONAZEPAM 1 MG: 0.5 TABLET ORAL at 08:30

## 2018-01-23 RX ADMIN — FINASTERIDE 5 MG: 5 TABLET, FILM COATED ORAL at 08:31

## 2018-01-23 ASSESSMENT — PAIN SCALES - GENERAL: PAINLEVEL_OUTOF10: 0

## 2018-01-23 NOTE — PROGRESS NOTES
Patient does not have glasses here. He has nobody to bring them in. He does feel comfortable  to review all his discharge medications, since they have not changed. Reviewed all discharge instructions including all medications and follow up instructions. Verbalized understanding. Patient repeated instructions correctly. Phone number for nursing is written on discharge folder. Patient understands to call if he has questions.

## 2018-01-23 NOTE — PROGRESS NOTES
Physical Therapy    Patient's personal walker arrived this morning and PTA adjusted it to fit patient. He is currently in bed waiting for lunch. States that he is going home after lunch. Denies any questions or concerns regarding therapy at this time.     Marguerite Likens PTA

## 2018-01-23 NOTE — PLAN OF CARE
Problem: Safety:  Goal: Free from accidental physical injury  Free from accidental physical injury   Outcome: Ongoing      Comments: Pillows under extremities to protect from skin tears.

## 2018-01-23 NOTE — PROGRESS NOTES
[facility]  Occupational Therapy  Daily Note  Date: 2018  Patient Name: Sebastian Rasheed        MRN: [id number]    : 1955  (65 y.o.)    Subjective: Pt seated in chair upon arrival.    Objective  ADL     Feeding: Setup  Grooming: Independent  UE Bathing: Setup  LE Bathing: Setup, Contact guard assistance  UE Dressing: Setup  LE Dressing: Setup, Contact guard assistance  Toileting: Contact guard assistance  Toilet Transfer: Contact guard assistance        Supine to Sit: Supervision  Sit to Supine: Supervision     Sit to stand: Contact guard assistance  Stand to sit: Contact guard assistance                     Balance  Sitting Balance: Independent  Standing Balance: Contact guard assistance  Standing Balance  Time: 13 min  Activity: card matching activity  Sit to stand: Contact guard assistance  Stand to sit: Contact guard assistance    Assessment  Assessment: Pt seated in bedside chair upon arrival. Agreeable to ADL session this am, pt requests to take sponge bath. Pt completes STS from chair w/ CGA and ambulates into BR w/ FWW & CGA. Pt completes UB/LB bathing & dressing as documented above. Requires moderate cuing for task sequence as pt frequently asks \"what next\". Pt demonstrates ability to doff gown & depends w/o assist but requires increased time to complete. Requires assistance for thoroughness regarding nathaniel care & washing buttocks. Requires assist to don socks and CGA to pull up pants & underwear. Pt's STS transfer from chair at sink side is Min A. Pt demonstrates poor safety awareness with transfers & use of FWW during transfers/ambulation. Pt returns to chair at end of session and remains there with call light in reach, personal alarm in place & all needs met.    Prognosis: Good    Goals  Short Term Goals  Time Frame for Short term goals: STGs=LTGs  Short term goal 1: Patient to be modified independent with UB/LB bathing  Short term goal 2: Patient to be modified independent with UB/LB dressing  Short term goal 3: Patient to tolerate 10 minutes static standing without LOB in order to complete self care activities. Short term goal 4: Patient to demonstate independence with simple meal prep, no LOB or SOB during activity.              Time In: 900  Time Out: 946  Timed Coded Minutes: 46  Total Treatment Time: [address]   AILEEN OTR/L  Date: 1/23/2018

## 2018-01-24 ENCOUNTER — CARE COORDINATION (OUTPATIENT)
Dept: CARE COORDINATION | Age: 63
End: 2018-01-24

## 2018-01-24 RX ORDER — PRIMIDONE 50 MG/1
50 TABLET ORAL NIGHTLY
COMMUNITY

## 2018-01-24 RX ORDER — DIVALPROEX SODIUM 500 MG/1
500 TABLET, DELAYED RELEASE ORAL 2 TIMES DAILY
COMMUNITY
End: 2018-01-27 | Stop reason: DRUGHIGH

## 2018-01-24 NOTE — CARE COORDINATION
is normal today. The patient did develop generalized weakness and was evaluated by PT. He became stronger with IV fluids. He was able to ambulate with a walker in the hallway. He declined further physical therapy. He will be discharged home with a rolling walker. He is to follow-up with  in one week.        Disposition: home     Condition: Stable     Copy of  note:   Discussed discharge with pt. Pt is a 65 yr old male admitted after a fall at home. Pt is alert and oriented.      Pt lives alone in an apartment complex. He is independent of all ADLs, he manages his own medications. He is not an active , he walks and uses the [employer] for appointments. Pt DMEs include standard walker, but feels he could use a rolling walker to better assist him with his mobility.      Pt PCP is Dr Kanwal Mora. Pt has insurance that covers his medications. 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 Pt stated that he is concerned about his medical bills after this stay in the hospital. SW gave pt application to complete. Pt stated that he manages his own bills and was able to complete application without assistance.      Pt sees counselors at [place] for his Schizophrenia. His  is Miya [facility] [phone]. Pt stated that Heena Etienne is assisting pt in finding assistance for house . Heena Etienne stated that he is assisting pt with getting connected with [facility] on [facility]. SW informed him about finical aid application. LESLI faxed emergency alert information to Heena Etienne [phone]. SW informed Heena Etienne of finical aid application for pt and asked if he could assist in completing process. Heena Etienne stated he will make a visit to pt home 1/25/18 at [id number] [place] [address]. SW relayed message to pt.      The discharge plan is home. Pt states that he is feeling better and the only thing he needs is a wheeled walker to assist him with his walking.  LESLI discussed swing bed coordination with other services you are now recommendation):  Required care/services in place and adequately coordinated   Suggested Interventions and Community Resources  Fall Risk Prevention:  Completed Home Health Services:  [address] Waiver: In Process   Meals on Wheels:  Declined   Life Skills Coaching:  Completed (Comment: patient has  through [facility])   Physical Therapy:  Declined   Specialty Service Referral:  Completed   Transportation Services:  Completed   Zone Management Tools:  Completed         Set up/Review an Education Plan, Set up/Review Goals              Prior to Admission medications    Medication Sig Start Date End Date Taking? Authorizing Provider   glucose blood VI test strips (FREESTYLE LITE) strip Test sugar once daily and as needed 11/27/17  Yes Ambrosio MINER Jump, DO   gabapentin (NEURONTIN) 300 MG capsule Take 1 capsule by mouth daily 10/5/17  Yes Ambrosio MINER Jump, DO   Lancets MISC Test blood sugar once daily and as needed( ONE TOUCH ULTRA DELICA) 1/34/40  Yes Ambrosio MINER Jump, DO   tamsulosin (FLOMAX) 0.4 MG capsule Take 1 capsule by mouth every evening 6/15/17  Yes Adan Nunez PA-C   finasteride (PROSCAR) 5 MG tablet take 1 tablet by mouth daily 6/5/17  Yes dAan Nunez PA-C   divalproex (DEPAKOTE ER) 500 MG extended release tablet 1 tablet once daily 3/7/17  Yes Historical Provider, MD   benztropine (COGENTIN) 1 MG tablet Take 0.5 mg by mouth 2 times daily Per Dr. Cornelia Jacobs   Yes Historical Provider, MD   primidone (MYSOLINE) 50 MG tablet Take 25 mg by mouth nightly Per Dr. Cornelia Jacobs, \"patient taking 1/2 tablet (25mg) nightly. \"   Yes Historical Provider, MD   risperiDONE (RISPERDAL) 1 MG tablet Take 1 mg by mouth Take in AM 11/20/15  Yes Historical Provider, MD   risperiDONE (RISPERDAL) 2 MG tablet Take 2 mg by mouth daily  10/12/15  Yes Historical Provider, MD   clonazePAM (KLONOPIN) 1 MG tablet Take 1 tablet by mouth 2 times daily.  11/24/14  Yes Donna Kerns, DO   Lancet Device MISC Delico Lancet Device. Use daily 3/17/14  Yes Ambrosio Barron, DO   Blood Glucose Monitoring Suppl KELLY Please dispense One Touch Ultra meter 3/6/14  Yes Ambrosio Barron, DO   propranolol (INDERAL) 40 MG tablet Take 40 mg by mouth 2 times daily.      Yes Historical Provider, MD       Future Appointments  Date Time Provider Daniela Ally   1/30/2018 11:00 AM Kerrie Metz DO Lucia Gores [facility]   4/5/2018 11:00 AM Donna Kerns DO Lucia Gores [facility] W   6/21/2018 2:00 PM Shamir Givens MD Will Urol [facility]

## 2018-01-27 ENCOUNTER — HOSPITAL ENCOUNTER (OUTPATIENT)
Age: 63
Setting detail: OBSERVATION
Discharge: NURSING, REHAB OR LONG-TERM CARE | End: 2018-01-29
Attending: FAMILY MEDICINE | Admitting: INTERNAL MEDICINE
Payer: MEDICARE

## 2018-01-27 DIAGNOSIS — E86.0 DEHYDRATION: ICD-10-CM

## 2018-01-27 DIAGNOSIS — T79.6XXA TRAUMATIC RHABDOMYOLYSIS, INITIAL ENCOUNTER (HCC): Primary | ICD-10-CM

## 2018-01-27 LAB
ABSOLUTE EOS #: 0.07 K/UL (ref 0–0.4)
ABSOLUTE IMMATURE GRANULOCYTE: ABNORMAL K/UL (ref 0–0.3)
ABSOLUTE LYMPH #: 0.94 K/UL (ref 1–4.8)
ABSOLUTE MONO #: 0.6 K/UL (ref 0–1)
ALBUMIN SERPL-MCNC: 3.8 G/DL (ref 3.5–5.2)
ALBUMIN/GLOBULIN RATIO: ABNORMAL (ref 1–2.5)
ALP BLD-CCNC: 50 U/L (ref 40–129)
ALT SERPL-CCNC: 24 U/L (ref 5–41)
ANION GAP SERPL CALCULATED.3IONS-SCNC: 11 MMOL/L (ref 9–17)
AST SERPL-CCNC: 27 U/L
BASOPHILS # BLD: ABNORMAL % (ref 0–2)
BASOPHILS ABSOLUTE: ABNORMAL K/UL (ref 0–0.2)
BILIRUB SERPL-MCNC: 0.72 MG/DL (ref 0.3–1.2)
BUN BLDV-MCNC: 19 MG/DL (ref 8–23)
BUN/CREAT BLD: 21 (ref 9–20)
CALCIUM SERPL-MCNC: 9.5 MG/DL (ref 8.6–10.4)
CHLORIDE BLD-SCNC: 103 MMOL/L (ref 98–107)
CO2: 28 MMOL/L (ref 20–31)
CREAT SERPL-MCNC: 0.91 MG/DL (ref 0.7–1.2)
DIFFERENTIAL TYPE: YES
EKG ATRIAL RATE: 70 BPM
EKG P AXIS: 31 DEGREES
EKG P-R INTERVAL: 158 MS
EKG Q-T INTERVAL: 418 MS
EKG QRS DURATION: 134 MS
EKG QTC CALCULATION (BAZETT): 451 MS
EKG R AXIS: 96 DEGREES
EKG T AXIS: 39 DEGREES
EKG VENTRICULAR RATE: 70 BPM
EOSINOPHILS RELATIVE PERCENT: 1 % (ref 0–5)
GFR AFRICAN AMERICAN: >60 ML/MIN
GFR NON-AFRICAN AMERICAN: >60 ML/MIN
GFR SERPL CREATININE-BSD FRML MDRD: ABNORMAL ML/MIN/{1.73_M2}
GFR SERPL CREATININE-BSD FRML MDRD: ABNORMAL ML/MIN/{1.73_M2}
GLUCOSE BLD-MCNC: 117 MG/DL (ref 70–99)
HCT VFR BLD CALC: 39.1 % (ref 41–53)
HEMOGLOBIN: 13 G/DL (ref 13.5–17.5)
IMMATURE GRANULOCYTES: ABNORMAL %
INR BLD: 1
LYMPHOCYTES # BLD: 14 % (ref 13–44)
MCH RBC QN AUTO: 30.5 PG (ref 26–34)
MCHC RBC AUTO-ENTMCNC: 33.2 G/DL (ref 31–37)
MCV RBC AUTO: 92.1 FL (ref 80–100)
MONOCYTES # BLD: 9 % (ref 5–9)
MORPHOLOGY: ABNORMAL
MYOGLOBIN: 406 NG/ML (ref 28–72)
NRBC AUTOMATED: ABNORMAL PER 100 WBC
PARTIAL THROMBOPLASTIN TIME: 24.8 SEC (ref 21–33)
PDW BLD-RTO: 15.2 % (ref 12.1–15.2)
PLATELET # BLD: 160 K/UL (ref 140–450)
PLATELET ESTIMATE: ABNORMAL
PMV BLD AUTO: ABNORMAL FL (ref 6–12)
POTASSIUM SERPL-SCNC: 3.6 MMOL/L (ref 3.7–5.3)
PROTHROMBIN TIME: 10.4 SEC (ref 9–11.6)
RBC # BLD: 4.24 M/UL (ref 4.5–5.9)
RBC # BLD: ABNORMAL 10*6/UL
SEG NEUTROPHILS: 76 % (ref 39–75)
SEGMENTED NEUTROPHILS ABSOLUTE COUNT: 5.09 K/UL (ref 2.1–6.5)
SODIUM BLD-SCNC: 142 MMOL/L (ref 135–144)
TOTAL CK: 656 U/L (ref 39–308)
TOTAL PROTEIN: 6.7 G/DL (ref 6.4–8.3)
TROPONIN INTERP: NORMAL
TROPONIN T: <0.03 NG/ML
WBC # BLD: 6.7 K/UL (ref 3.5–11)
WBC # BLD: ABNORMAL 10*3/UL

## 2018-01-27 PROCEDURE — 84484 ASSAY OF TROPONIN QUANT: CPT

## 2018-01-27 PROCEDURE — G0378 HOSPITAL OBSERVATION PER HR: HCPCS

## 2018-01-27 PROCEDURE — 94760 N-INVAS EAR/PLS OXIMETRY 1: CPT

## 2018-01-27 PROCEDURE — 96361 HYDRATE IV INFUSION ADD-ON: CPT

## 2018-01-27 PROCEDURE — 2580000003 HC RX 258: Performed by: FAMILY MEDICINE

## 2018-01-27 PROCEDURE — 83874 ASSAY OF MYOGLOBIN: CPT

## 2018-01-27 PROCEDURE — 96372 THER/PROPH/DIAG INJ SC/IM: CPT

## 2018-01-27 PROCEDURE — 99285 EMERGENCY DEPT VISIT HI MDM: CPT

## 2018-01-27 PROCEDURE — 6370000000 HC RX 637 (ALT 250 FOR IP): Performed by: INTERNAL MEDICINE

## 2018-01-27 PROCEDURE — 93005 ELECTROCARDIOGRAM TRACING: CPT

## 2018-01-27 PROCEDURE — 96360 HYDRATION IV INFUSION INIT: CPT

## 2018-01-27 PROCEDURE — 82550 ASSAY OF CK (CPK): CPT

## 2018-01-27 PROCEDURE — 85730 THROMBOPLASTIN TIME PARTIAL: CPT

## 2018-01-27 PROCEDURE — 2580000003 HC RX 258: Performed by: INTERNAL MEDICINE

## 2018-01-27 PROCEDURE — 85610 PROTHROMBIN TIME: CPT

## 2018-01-27 PROCEDURE — 80053 COMPREHEN METABOLIC PANEL: CPT

## 2018-01-27 PROCEDURE — 85025 COMPLETE CBC W/AUTO DIFF WBC: CPT

## 2018-01-27 PROCEDURE — 36415 COLL VENOUS BLD VENIPUNCTURE: CPT

## 2018-01-27 PROCEDURE — 6360000002 HC RX W HCPCS: Performed by: INTERNAL MEDICINE

## 2018-01-27 RX ORDER — GABAPENTIN 300 MG/1
300 CAPSULE ORAL DAILY
Status: DISCONTINUED | OUTPATIENT
Start: 2018-01-27 | End: 2018-01-29 | Stop reason: HOSPADM

## 2018-01-27 RX ORDER — PROPRANOLOL HYDROCHLORIDE 10 MG/1
40 TABLET ORAL 2 TIMES DAILY
Status: DISCONTINUED | OUTPATIENT
Start: 2018-01-27 | End: 2018-01-29 | Stop reason: HOSPADM

## 2018-01-27 RX ORDER — TAMSULOSIN HYDROCHLORIDE 0.4 MG/1
0.4 CAPSULE ORAL EVERY EVENING
Status: DISCONTINUED | OUTPATIENT
Start: 2018-01-27 | End: 2018-01-29 | Stop reason: HOSPADM

## 2018-01-27 RX ORDER — BENZTROPINE MESYLATE 0.5 MG/1
0.5 TABLET ORAL 2 TIMES DAILY
Status: DISCONTINUED | OUTPATIENT
Start: 2018-01-27 | End: 2018-01-29 | Stop reason: HOSPADM

## 2018-01-27 RX ORDER — DIVALPROEX SODIUM 250 MG/1
1000 TABLET, EXTENDED RELEASE ORAL DAILY
Status: DISCONTINUED | OUTPATIENT
Start: 2018-01-27 | End: 2018-01-29 | Stop reason: HOSPADM

## 2018-01-27 RX ORDER — DIVALPROEX SODIUM 500 MG/1
1000 TABLET, EXTENDED RELEASE ORAL DAILY
COMMUNITY

## 2018-01-27 RX ORDER — ACETAMINOPHEN 325 MG/1
650 TABLET ORAL EVERY 4 HOURS PRN
Status: DISCONTINUED | OUTPATIENT
Start: 2018-01-27 | End: 2018-01-29 | Stop reason: HOSPADM

## 2018-01-27 RX ORDER — 0.9 % SODIUM CHLORIDE 0.9 %
1000 INTRAVENOUS SOLUTION INTRAVENOUS ONCE
Status: COMPLETED | OUTPATIENT
Start: 2018-01-27 | End: 2018-01-27

## 2018-01-27 RX ORDER — RISPERIDONE 2 MG/1
2 TABLET, FILM COATED ORAL NIGHTLY
Status: DISCONTINUED | OUTPATIENT
Start: 2018-01-27 | End: 2018-01-29 | Stop reason: HOSPADM

## 2018-01-27 RX ORDER — ONDANSETRON 2 MG/ML
4 INJECTION INTRAMUSCULAR; INTRAVENOUS EVERY 6 HOURS PRN
Status: DISCONTINUED | OUTPATIENT
Start: 2018-01-27 | End: 2018-01-29 | Stop reason: HOSPADM

## 2018-01-27 RX ORDER — PRIMIDONE 50 MG/1
50 TABLET ORAL NIGHTLY
Status: DISCONTINUED | OUTPATIENT
Start: 2018-01-27 | End: 2018-01-29 | Stop reason: HOSPADM

## 2018-01-27 RX ORDER — RISPERIDONE 0.5 MG/1
1 TABLET, FILM COATED ORAL EVERY MORNING
Status: DISCONTINUED | OUTPATIENT
Start: 2018-01-28 | End: 2018-01-29 | Stop reason: HOSPADM

## 2018-01-27 RX ORDER — SODIUM CHLORIDE 0.9 % (FLUSH) 0.9 %
10 SYRINGE (ML) INJECTION EVERY 12 HOURS SCHEDULED
Status: DISCONTINUED | OUTPATIENT
Start: 2018-01-27 | End: 2018-01-29 | Stop reason: HOSPADM

## 2018-01-27 RX ORDER — SODIUM CHLORIDE 9 MG/ML
INJECTION, SOLUTION INTRAVENOUS CONTINUOUS
Status: DISCONTINUED | OUTPATIENT
Start: 2018-01-27 | End: 2018-01-29 | Stop reason: HOSPADM

## 2018-01-27 RX ORDER — SODIUM CHLORIDE 0.9 % (FLUSH) 0.9 %
10 SYRINGE (ML) INJECTION PRN
Status: DISCONTINUED | OUTPATIENT
Start: 2018-01-27 | End: 2018-01-29 | Stop reason: HOSPADM

## 2018-01-27 RX ORDER — FINASTERIDE 5 MG/1
5 TABLET, FILM COATED ORAL DAILY
Status: DISCONTINUED | OUTPATIENT
Start: 2018-01-27 | End: 2018-01-29 | Stop reason: HOSPADM

## 2018-01-27 RX ORDER — CLONAZEPAM 0.5 MG/1
1 TABLET ORAL 2 TIMES DAILY
Status: DISCONTINUED | OUTPATIENT
Start: 2018-01-27 | End: 2018-01-29 | Stop reason: HOSPADM

## 2018-01-27 RX ADMIN — PRIMIDONE 50 MG: 50 TABLET ORAL at 20:55

## 2018-01-27 RX ADMIN — ENOXAPARIN SODIUM 40 MG: 40 INJECTION SUBCUTANEOUS at 21:16

## 2018-01-27 RX ADMIN — CLONAZEPAM 1 MG: 0.5 TABLET ORAL at 21:16

## 2018-01-27 RX ADMIN — BENZTROPINE MESYLATE 0.5 MG: 0.5 TABLET ORAL at 20:53

## 2018-01-27 RX ADMIN — PROPRANOLOL HYDROCHLORIDE 40 MG: 10 TABLET ORAL at 20:52

## 2018-01-27 RX ADMIN — FINASTERIDE 5 MG: 5 TABLET, FILM COATED ORAL at 20:54

## 2018-01-27 RX ADMIN — SODIUM CHLORIDE 1000 ML: 9 INJECTION, SOLUTION INTRAVENOUS at 17:30

## 2018-01-27 RX ADMIN — TAMSULOSIN HYDROCHLORIDE 0.4 MG: 0.4 CAPSULE ORAL at 20:57

## 2018-01-27 RX ADMIN — SODIUM CHLORIDE: 9 INJECTION, SOLUTION INTRAVENOUS at 20:51

## 2018-01-27 RX ADMIN — RISPERIDONE 2 MG: 2 TABLET ORAL at 20:52

## 2018-01-27 NOTE — ED NOTES
Complains of bed being uncomfortable, repositioned onto right side.      Bria Coronado RN  01/27/18 2943

## 2018-01-27 NOTE — Clinical Note
Patient Class: Observation [104]   REQUIRED: Diagnosis: Dehydration [276.51. ICD-9-CM]   Estimated Length of Stay: Estimated stay of less than 2 midnights   Future Attending Provider: Bravo Rodriguez [[id number]]   Admitting Provider: Bravo Rodriguez [[id number]]   Telemetry Bed Required?: No

## 2018-01-28 LAB
ANION GAP SERPL CALCULATED.3IONS-SCNC: 11 MMOL/L (ref 9–17)
BUN BLDV-MCNC: 21 MG/DL (ref 8–23)
BUN/CREAT BLD: 25 (ref 9–20)
CALCIUM SERPL-MCNC: 8.5 MG/DL (ref 8.6–10.4)
CHLORIDE BLD-SCNC: 107 MMOL/L (ref 98–107)
CO2: 26 MMOL/L (ref 20–31)
CREAT SERPL-MCNC: 0.84 MG/DL (ref 0.7–1.2)
GFR AFRICAN AMERICAN: >60 ML/MIN
GFR NON-AFRICAN AMERICAN: >60 ML/MIN
GFR SERPL CREATININE-BSD FRML MDRD: ABNORMAL ML/MIN/{1.73_M2}
GFR SERPL CREATININE-BSD FRML MDRD: ABNORMAL ML/MIN/{1.73_M2}
GLUCOSE BLD-MCNC: 113 MG/DL (ref 70–99)
MYOGLOBIN: 142 NG/ML (ref 28–72)
POTASSIUM SERPL-SCNC: 3.8 MMOL/L (ref 3.7–5.3)
SODIUM BLD-SCNC: 144 MMOL/L (ref 135–144)

## 2018-01-28 PROCEDURE — 6360000002 HC RX W HCPCS: Performed by: INTERNAL MEDICINE

## 2018-01-28 PROCEDURE — G8979 MOBILITY GOAL STATUS: HCPCS

## 2018-01-28 PROCEDURE — 80048 BASIC METABOLIC PNL TOTAL CA: CPT

## 2018-01-28 PROCEDURE — 51702 INSERT TEMP BLADDER CATH: CPT

## 2018-01-28 PROCEDURE — 36415 COLL VENOUS BLD VENIPUNCTURE: CPT

## 2018-01-28 PROCEDURE — 2580000003 HC RX 258: Performed by: INTERNAL MEDICINE

## 2018-01-28 PROCEDURE — 83874 ASSAY OF MYOGLOBIN: CPT

## 2018-01-28 PROCEDURE — G0378 HOSPITAL OBSERVATION PER HR: HCPCS

## 2018-01-28 PROCEDURE — G8978 MOBILITY CURRENT STATUS: HCPCS

## 2018-01-28 PROCEDURE — 94761 N-INVAS EAR/PLS OXIMETRY MLT: CPT

## 2018-01-28 PROCEDURE — 97162 PT EVAL MOD COMPLEX 30 MIN: CPT

## 2018-01-28 PROCEDURE — 96372 THER/PROPH/DIAG INJ SC/IM: CPT

## 2018-01-28 PROCEDURE — 6370000000 HC RX 637 (ALT 250 FOR IP): Performed by: INTERNAL MEDICINE

## 2018-01-28 RX ADMIN — SODIUM CHLORIDE: 9 INJECTION, SOLUTION INTRAVENOUS at 07:06

## 2018-01-28 RX ADMIN — GABAPENTIN 300 MG: 300 CAPSULE ORAL at 09:35

## 2018-01-28 RX ADMIN — BENZTROPINE MESYLATE 0.5 MG: 0.5 TABLET ORAL at 09:36

## 2018-01-28 RX ADMIN — ENOXAPARIN SODIUM 40 MG: 40 INJECTION SUBCUTANEOUS at 09:35

## 2018-01-28 RX ADMIN — RISPERIDONE 1 MG: 0.5 TABLET, FILM COATED ORAL at 09:36

## 2018-01-28 RX ADMIN — SODIUM CHLORIDE: 9 INJECTION, SOLUTION INTRAVENOUS at 17:10

## 2018-01-28 RX ADMIN — CLONAZEPAM 1 MG: 0.5 TABLET ORAL at 09:36

## 2018-01-28 RX ADMIN — DIVALPROEX SODIUM 1000 MG: 250 TABLET, EXTENDED RELEASE ORAL at 09:35

## 2018-01-28 RX ADMIN — BENZTROPINE MESYLATE 0.5 MG: 0.5 TABLET ORAL at 21:24

## 2018-01-28 RX ADMIN — RISPERIDONE 2 MG: 2 TABLET ORAL at 21:25

## 2018-01-28 RX ADMIN — PROPRANOLOL HYDROCHLORIDE 40 MG: 10 TABLET ORAL at 09:35

## 2018-01-28 RX ADMIN — CLONAZEPAM 1 MG: 0.5 TABLET ORAL at 17:23

## 2018-01-28 RX ADMIN — FINASTERIDE 5 MG: 5 TABLET, FILM COATED ORAL at 09:36

## 2018-01-28 RX ADMIN — TAMSULOSIN HYDROCHLORIDE 0.4 MG: 0.4 CAPSULE ORAL at 17:23

## 2018-01-28 RX ADMIN — PRIMIDONE 50 MG: 50 TABLET ORAL at 21:24

## 2018-01-28 NOTE — H&P
no hearing difficulty, no earache   Mouth/throat: no sore throat   Lungs: no cough, no shortness of breath, no wheeze  CVS: no palpitation, no chest pain, no shortness of breath  GI: no abdominal pain, no nausea , no vomiting, no constipation  DARREN: no dysuria, frequency and urgency, no hematuria,  Musculoskeletal: Positive for  joint pain,  No swelling ,no  stiffness  Endocrine: no polyuria, polydypsia, no cold or heat intolerence  Hematology: no anemia, no easy brusing or bleeding  Dermatology: no skin rash  Psychiatry: no depression, no anxiety, positive for schizophrenia   Neurology: no syncope, no seizures, no numbness or tingling of hands, no numbness or tingling of feet, no paresis        Vitals:   Vitals:    01/28/18 0810   BP: 112/68   Pulse: 65   Resp: 18   Temp: 98.1 °F (36.7 °C)   SpO2: 92%      BMI: Body mass index is 23.99 kg/m². Physical Exam:  General Appearance: alert and oriented to person, place and time, in no acute distress  Cardiovascular: normal rate, regular rhythm, normal S1 and S2, no murmurs  Pulmonary/Chest: clear to auscultation bilaterally- no wheezes, rales or rhonchi, normal air movement, no respiratory distress  Abdomen: soft, non-tender, non-distended, normal bowel sounds, no masses Extremities: no cyanosis, clubbing or edema,  pedal pulses 2+  Skin: warm and dry, no rash or erythema  Head: normocephalic and atraumatic.    Eyes: pupils equal, round, and reactive to light  Neck: supple and non-tender without mass, no thyromegaly   Musculoskeletal: normal range of motion, no joint swelling, deformity or tenderness  Neurological: alert, oriented, normal speech, no focal findings or movement disorder noted    Review of Labs and Diagnostic Testing:    Recent Results (from the past 24 hour(s))   EKG 12 lead    Collection Time: 01/27/18  3:42 PM   Result Value Ref Range    Ventricular Rate 70 BPM    Atrial Rate 70 BPM    P-R Interval 158 ms    QRS Duration 134 ms    Q-T Interval 418 ms

## 2018-01-29 VITALS
HEART RATE: 65 BPM | DIASTOLIC BLOOD PRESSURE: 75 MMHG | RESPIRATION RATE: 18 BRPM | OXYGEN SATURATION: 93 % | TEMPERATURE: 98.1 F | SYSTOLIC BLOOD PRESSURE: 133 MMHG | BODY MASS INDEX: 23.86 KG/M2 | WEIGHT: 191.9 LBS | HEIGHT: 75 IN

## 2018-01-29 LAB
% CKMB: 1 % (ref 0–3.5)
ANION GAP SERPL CALCULATED.3IONS-SCNC: 11 MMOL/L (ref 9–17)
BUN BLDV-MCNC: 15 MG/DL (ref 8–23)
BUN/CREAT BLD: 21 (ref 9–20)
CALCIUM SERPL-MCNC: 8.4 MG/DL (ref 8.6–10.4)
CHLORIDE BLD-SCNC: 105 MMOL/L (ref 98–107)
CK MB: 2.7 NG/ML
CKMB INTERPRETATION: NORMAL
CO2: 24 MMOL/L (ref 20–31)
CREAT SERPL-MCNC: 0.73 MG/DL (ref 0.7–1.2)
GFR AFRICAN AMERICAN: >60 ML/MIN
GFR NON-AFRICAN AMERICAN: >60 ML/MIN
GFR SERPL CREATININE-BSD FRML MDRD: ABNORMAL ML/MIN/{1.73_M2}
GFR SERPL CREATININE-BSD FRML MDRD: ABNORMAL ML/MIN/{1.73_M2}
GLUCOSE BLD-MCNC: 110 MG/DL (ref 70–99)
MYOGLOBIN: 61 NG/ML (ref 28–72)
POTASSIUM SERPL-SCNC: 3.7 MMOL/L (ref 3.7–5.3)
SODIUM BLD-SCNC: 140 MMOL/L (ref 135–144)
TOTAL CK: 278 U/L (ref 39–308)

## 2018-01-29 PROCEDURE — 97110 THERAPEUTIC EXERCISES: CPT

## 2018-01-29 PROCEDURE — 82550 ASSAY OF CK (CPK): CPT

## 2018-01-29 PROCEDURE — 96372 THER/PROPH/DIAG INJ SC/IM: CPT

## 2018-01-29 PROCEDURE — 97116 GAIT TRAINING THERAPY: CPT

## 2018-01-29 PROCEDURE — 36415 COLL VENOUS BLD VENIPUNCTURE: CPT

## 2018-01-29 PROCEDURE — 80048 BASIC METABOLIC PNL TOTAL CA: CPT

## 2018-01-29 PROCEDURE — G0378 HOSPITAL OBSERVATION PER HR: HCPCS

## 2018-01-29 PROCEDURE — G8987 SELF CARE CURRENT STATUS: HCPCS

## 2018-01-29 PROCEDURE — 2580000003 HC RX 258: Performed by: INTERNAL MEDICINE

## 2018-01-29 PROCEDURE — 94761 N-INVAS EAR/PLS OXIMETRY MLT: CPT

## 2018-01-29 PROCEDURE — 6360000002 HC RX W HCPCS: Performed by: INTERNAL MEDICINE

## 2018-01-29 PROCEDURE — 6370000000 HC RX 637 (ALT 250 FOR IP): Performed by: INTERNAL MEDICINE

## 2018-01-29 PROCEDURE — 97165 OT EVAL LOW COMPLEX 30 MIN: CPT

## 2018-01-29 PROCEDURE — 83874 ASSAY OF MYOGLOBIN: CPT

## 2018-01-29 PROCEDURE — G8988 SELF CARE GOAL STATUS: HCPCS

## 2018-01-29 PROCEDURE — 82553 CREATINE MB FRACTION: CPT

## 2018-01-29 RX ORDER — ACETAMINOPHEN 325 MG/1
650 TABLET ORAL EVERY 4 HOURS PRN
Qty: 120 TABLET | Refills: 3
Start: 2018-01-29 | End: 2018-06-21

## 2018-01-29 RX ADMIN — FINASTERIDE 5 MG: 5 TABLET, FILM COATED ORAL at 10:12

## 2018-01-29 RX ADMIN — DIVALPROEX SODIUM 1000 MG: 250 TABLET, EXTENDED RELEASE ORAL at 10:12

## 2018-01-29 RX ADMIN — SODIUM CHLORIDE: 9 INJECTION, SOLUTION INTRAVENOUS at 13:56

## 2018-01-29 RX ADMIN — CLONAZEPAM 1 MG: 0.5 TABLET ORAL at 10:12

## 2018-01-29 RX ADMIN — BENZTROPINE MESYLATE 0.5 MG: 0.5 TABLET ORAL at 10:13

## 2018-01-29 RX ADMIN — PROPRANOLOL HYDROCHLORIDE 40 MG: 10 TABLET ORAL at 10:12

## 2018-01-29 RX ADMIN — ENOXAPARIN SODIUM 40 MG: 40 INJECTION SUBCUTANEOUS at 10:13

## 2018-01-29 RX ADMIN — RISPERIDONE 1 MG: 0.5 TABLET, FILM COATED ORAL at 10:11

## 2018-01-29 RX ADMIN — GABAPENTIN 300 MG: 300 CAPSULE ORAL at 10:12

## 2018-01-29 NOTE — PLAN OF CARE
[facility]    Inpatient Occupational Therapy  Plan of Care  OT Orders Received and Evaluation Complete  Date: 2018  Patient Name: Christianne Toro        MRN: [id number]    : 1955  (65 y.o.)  Referring Practitioner: Dr. Virginia Lin   Onset Date: 18  Referral Date: 18     Treatment Diagnosis: weakness    Identified Problem Areas  Performance deficits / Impairments: Decreased functional mobility , Decreased ADL status, Decreased strength, Decreased cognition, Decreased high-level IADLs  Assessment: Patient demonstrated good knowledge of PLOF and current situation. Patient states he feels he is not drinking enough water at home, making him feel weak. Noted weakness in B/L UE. Demonstrated ability to complete sit to stand and ambulate around room with CGA/Supervision. Patient reports he is feeling better but is having difficulty with daily tasks at home. Family is concerned about the patients ability to take care of self at this time.    Prognosis: Good     Justification for Skilled Services:  [x]  Complete Daily Tasks Safely  [x] Improve Balance   [x]  Return to Prior Level of Function  [x]  Family/Caregiver Education    [x] Improve UE strength  [x] Patient Education: []Adaptive Equipment   [x]Home Exercise Program and Progression    Treatment Plan  Plan  Times per week: 5x  Times per day: Daily (1-2x day)  Plan weeks: 3 days   [] Modalities:  [x] Therapeutic Exercise   [x] Therapeutic Activity  [] Splinting:     [] Home Safety Evaluation         [x] ADL Retraining                       [] Muscle Re-education [] Cognitive Retraining            [] Sensory Integration  [x] Patient Education [x] Home Exercise Program [] Fine Motor Coordination  Discharge Recommendations: Subacute/Skilled Nursing Facility    GOALS  Short term goals  Time Frame for Short term goals: 3 days (POC expires 18)  Short term goal 1: Patient to be modified independent with UB/LB bathing  Short term goal 2:

## 2018-01-29 NOTE — PLAN OF CARE
Problem: Falls - Risk of  Goal: Absence of falls  Outcome: Met This Shift      Problem: Falls - Risk of:  Goal: Will remain free from falls  Will remain free from falls   Outcome: Met This Shift

## 2018-01-29 NOTE — PROGRESS NOTES
DR Hancock notified that [facility] has a bed available for discharge today. Will place discharge orders.
[facility]  Physical Therapy  Evaluation  Date: 2018  Patient Name: Gabriella Hinds        MRN: [id number]    : 1955  (65 y.o.)  Gender: male   Referring Practitioner: Dr. Kristyn Calix  Diagnosis: Weakness;  fall at home  Additional Pertinent Hx: Admitted for observation following a fall at home. Patient recently discharged from the hospital to home on 18.,   Patient states he fell and couldn't get up without assist.   Patient had recieved PT as an in-patient and was moving at baseline status with wh walker.   Patient with Parkinsons   Past Medical History:   Diagnosis Date    Chronic schizophrenic ([facility]     Hyperlipidemia     Hypertension     Neuromuscular disorder ([facility]     Type II or unspecified type diabetes mellitus without mention of complication, not stated as uncontrolled      Past Surgical History:   Procedure Laterality Date    COLONOSCOPY      COLONOSCOPY  14    colon polyp    FOOT SURGERY      right foot    HEMORRHOID SURGERY         Restrictions         Subjective              Orientation  Overall Orientation Status: Within Functional Limits    Home Living  Type of Home: Apartment  Home Equipment: Rolling walker    Prior Level of Function  Prior Function  Lives With: Alone  Ambulation Assistance: Independent  Transfer Assistance: Independent      Objective                       Strength RLE  Strength RLE: WFL  Comment: Generally 3/5 knee extension; hip flexion; ankle DF  Strength LLE  Strength LLE: WFL  Comment: Generally 3/5 knee extension; hip flexion; ankle DF  Strength RUE  Strength RUE: WFL  Comment: 3/5  Strength LUE  Strength LUE: WFL  Comment: 3/5                Sit to Stand: Minimal Assistance, Moderate Assistance (dependent on seat ht)  Stand to sit: Minimal Assistance           Ambulation 1  Surface: level tile  Device: Rolling Walker  Assistance: Stand by assistance, Contact guard assistance  Quality of Gait: Forward flexed posture; limited trunk
Phone: Seven  Date: 2018  Fax: [phone]      Physical Therapy    Daily Note    Patient Name: Sebastian Rasheed      : 1955  (65 y.o.)  MRN: [id number]     [x] Patient requires additional Physical Therapy    [] Anticipate Physical Therapy Discharge Soon     Assessment                   Sit to Stand: Supervision  Stand to sit: Supervision, Stand by assistance                Ambulation 1  Surface: level tile  Device: Rolling Walker  Other Apparatus:  (IV pole)  Assistance: Stand by assistance, Contact guard assistance  Quality of Gait: Forward flexed posture; limited trunk rotation;  short step length  Distance: 100 ft x2  Comments: patient room to therapy room back to patient room             Assessment: Patient in chair upon arrival.  Sit to stand from chair is supervison. Ambulates with wheeled walker to therapy room with CGA/SBA. No LOB noted with gait. Completes seated and standing exercise per doc flowsheet. Returns to room again without LOB and sits up in chair for lunch. Call light in reach.      Safety Devices  Type of devices: Call light within reach, Gait belt, Left in chair          Time In: 1117  Time Out: 1159  Timed Coded Minutes: 42  Total Treatment Time: 42    Exercises:  See [address] Per Plan Of Care    Goals  Short Term Goals  Time Frame for Short term goals: 5 days  Short term goal 1: Safety and independence with basic transfers in/out of chair and commode  Short term goal 2: Mod independent ambulation with wheeled walker x 75 ft x 2 to negotiate home environement  Short term goal 3: Good dynamic standing balance to complete self-care tasks  Short term goal 4: Provide recommendations for home DME to assist with patients safety and independence       [id number] [facility] License Number: [id number]    Date: 2018
Pt has placed call light several times and appears to be anxious and wanting social interaction; time offered. Pt feels like shaky and stronger and holds own urinal and using remote to watch television. Pt states he hasn't been taking his meds and that increases his shaking; he also feels he may be falling because he isn't drinking enough water at home. He only drinks when he goes to [employer] and gets a soda during the day. Pt voids 100 mls of urine and continues to watch television.
Pt to room #260 via cart, pt requires x4 assist to be transferred from cart to bed. Pt wet with urine and cleaned up at this time. Pt feels he is too shaky and weak to help with transfer or with urination needs and asks nursing to assist. Pt oriented to room and call light, verbalizes understanding. Call light and bedside table within reach.
Report phoned to Tania MARTI at the [address] [place], [place] [address].
external eye, conjunctiva, lids cornea, JOHN. Nose: Normal external nose, mucus membranes and septum. Neck: no adenopathy and supple, symmetrical, trachea midline  Lungs: clear to auscultation bilaterally  Heart: regular rate and rhythm, S1, S2 normal, no murmur, click, rub or gallop  Abdomen: soft, non-tender; bowel sounds normal; no masses,  no organomegaly  Extremities: Bilateral lower leg edema, no calf tenderness. Neurologic: Mental status: Alert, oriented, thought content appropriate    Assessment and Plan:   1. Mild Rhabdomyolysis - improved with IV hydration. 2.  Generalized weakness - resulting in multiple falls. PT / OT ordered. ECF placement will be needed at discharge. 3.  HTN - stable. 4. BPH - follows with Dr. Denis Davis in Urology. 5.  Schizophrenia - stable on his current medication. 6.  Parkinson's - on Primidone    Plan:  1.  to evaluate this am and make arrangements for ECF placement. Will DC when arrangements are made. 2.  Follow up on am labs when available.          Patient Active Problem List:     Type 2 diabetes mellitus ([address]     Essential hypertension, benign     Hematuria     Lower urinary tract symptoms (LUTS)     BPH (benign prostatic hyperplasia)     Microscopic hematuria     Yeast dermatitis of penis     Blood in stool     Scrotal irritation     Non-traumatic rhabdomyolysis     Generalized weakness     Benign non-nodular prostatic hyperplasia with lower urinary tract symptoms     Urinary retention     Rhabdomyolysis     Mild malnutrition ([address]     Dehydration      Gallito Hancock MD  [facility]

## 2018-02-02 ENCOUNTER — CARE COORDINATION (OUTPATIENT)
Dept: CARE COORDINATION | Age: 63
End: 2018-02-02

## 2018-02-16 ENCOUNTER — CARE COORDINATION (OUTPATIENT)
Dept: CARE COORDINATION | Age: 63
End: 2018-02-16

## 2018-02-16 NOTE — CARE COORDINATION
Ambulatory Care Coordination Note  2/16/2018  CM Risk Score: 2  Carlos Mortality Risk Score:      ACC: Steffi Adair RN    Summary Note:     Phone call to [facility] [phone]. Spoke to Tania, one of the nurses. Patient is still at facility; unknown d/c date at this time. Passport assessment was completed, and patient does approve for this. CC Plan: Will await notification of discharge to follow up with patient. Care Coordination Interventions    Program Enrollment:  Rising Risk  Referral from Primary Care Provider:  No  Suggested Interventions and Community Resources  Fall Risk Prevention:  Completed (Comment: Mailed fall precautions)  Home Care Waiver: In Process  Home Health Services:  Declined  Life Skills Coaching:  Completed (Comment: patient has  through [facility])  Meals on Wheels:  Declined  Physical Therapy:  Declined  Specialty Services Referral:  Completed (Comment: podiatry, Dr. Carmelo Harris, Dr. Mulugeta Baker)  Transportation Support:  Completed  Zone Management Tools:  Completed (Comment: mailed to patient today 01/23/18)           Prior to Admission medications    Medication Sig Start Date End Date Taking?  Authorizing Provider   acetaminophen (TYLENOL) 325 MG tablet Take 2 tablets by mouth every 4 hours as needed for Pain or Fever 1/29/18   Jorge Hancock MD   divalproex (DEPAKOTE ER) 500 MG extended release tablet Take 1,000 mg by mouth daily    Historical Provider, MD   primidone (MYSOLINE) 50 MG tablet Take 50 mg by mouth nightly Per Dr. Mora July; 1/2-1 tablet nightly    Historical Provider, MD   glucose blood VI test strips (FREESTYLE LITE) strip Test sugar once daily and as needed 11/27/17   Yetta Caras A Jump, DO   gabapentin (NEURONTIN) 300 MG capsule Take 1 capsule by mouth daily 10/5/17   Yetta Caras A Jump, DO   Lancets MISC Test blood sugar once daily and as needed( ONE TOUCH ULTRA DELICA) 9/98/75   Jose Ramon Jump, DO   tamsulosin (FLOMAX) 0.4 MG capsule Take 1 capsule

## 2018-02-19 ENCOUNTER — CARE COORDINATION (OUTPATIENT)
Dept: CARE COORDINATION | Age: 63
End: 2018-02-19

## 2018-02-19 NOTE — CARE COORDINATION
Dr. Caralee Snellen:     Please see CC note       Ambulatory Care Coordination Note  2/19/2018  CM Risk Score: 2  Carlos Mortality Risk Score:      ACC: London Keys RN    Summary Note:     Voicemail received from Aaron at home to inform that patient went home from [facility] and they are going to admit patient for services. Phone call to Aaron at [facility]; spoke to [place]; and informed that patient is admitted to services. Per Angelica; patient is awaiting passport approval.\" Home health aide to start as soon as they are able, to assist in the meantime. \"    Physical therapy and Home health nursing both to be in home. Per Angelica at [facility] are expediting patient's passport approval to try to get patient's services started. \"     Spoke with Paz Vail, patient's [facility] . Per Nik Funk is trying to work on [employer]; and is working with passport approval as well. \" Paz Vail to keep this nurse CC up to date. Scheduled patient with Dr. Caralee Snellen for Wednesday 02/21/18 to see for [facility] follow up, as Dr. Felipe Esquivel is out of the office for the next month. Phone call to patient today to follow up. Informs, \"he just finished a good meal.\" His sister, Kiko Monzon there today, and he ate good. \"   States, Kennedi Wright has had a home health nurse and physical therapist both there today. \" Informs, \"the home health aide will be starting. \"   Reports, Kennedi Wright is using his walker in his apartment. \" Reviewed fall precautions. Patient states, Kennedi Wright is doing fine today being back home. \"     Phone call to tangela Allen to schedule patient for transportation for this Wednesday to come see Dr. Caralee Snellen on 02/21/18 at 930. Phone call back to patient to notify. Phone call to [facility] for meals on wheels set up; [phone]; left voicemail message for Silvio Nicole; requesting phone call back.    Received phone call back from Silvio Nicole who reported that she has contacted patient and left a voicemail regarding setting up meals on

## 2018-02-21 ENCOUNTER — OFFICE VISIT (OUTPATIENT)
Dept: FAMILY MEDICINE CLINIC | Age: 63
End: 2018-02-21
Payer: MEDICARE

## 2018-02-21 ENCOUNTER — CARE COORDINATOR VISIT (OUTPATIENT)
Dept: CARE COORDINATION | Age: 63
End: 2018-02-21

## 2018-02-21 VITALS
WEIGHT: 197 LBS | OXYGEN SATURATION: 96 % | SYSTOLIC BLOOD PRESSURE: 88 MMHG | BODY MASS INDEX: 24.62 KG/M2 | HEART RATE: 74 BPM | DIASTOLIC BLOOD PRESSURE: 64 MMHG

## 2018-02-21 DIAGNOSIS — R53.1 WEAKNESS: ICD-10-CM

## 2018-02-21 DIAGNOSIS — T79.6XXD TRAUMATIC RHABDOMYOLYSIS, SUBSEQUENT ENCOUNTER: Primary | ICD-10-CM

## 2018-02-21 PROCEDURE — 1111F DSCHRG MED/CURRENT MED MERGE: CPT | Performed by: FAMILY MEDICINE

## 2018-02-21 PROCEDURE — G8427 DOCREV CUR MEDS BY ELIG CLIN: HCPCS | Performed by: FAMILY MEDICINE

## 2018-02-21 PROCEDURE — 3017F COLORECTAL CA SCREEN DOC REV: CPT | Performed by: FAMILY MEDICINE

## 2018-02-21 PROCEDURE — 99214 OFFICE O/P EST MOD 30 MIN: CPT | Performed by: FAMILY MEDICINE

## 2018-02-21 PROCEDURE — G8484 FLU IMMUNIZE NO ADMIN: HCPCS | Performed by: FAMILY MEDICINE

## 2018-02-21 PROCEDURE — G8420 CALC BMI NORM PARAMETERS: HCPCS | Performed by: FAMILY MEDICINE

## 2018-02-21 PROCEDURE — 1036F TOBACCO NON-USER: CPT | Performed by: FAMILY MEDICINE

## 2018-02-21 ASSESSMENT — ENCOUNTER SYMPTOMS
DIARRHEA: 0
ABDOMINAL PAIN: 0
VOMITING: 0
EYE DISCHARGE: 0
CONSTIPATION: 0
EYE REDNESS: 0
BLOOD IN STOOL: 0
NAUSEA: 0
TROUBLE SWALLOWING: 0
COUGH: 0
SHORTNESS OF BREATH: 0

## 2018-02-21 NOTE — CARE COORDINATION
passport, and left voicemail message for [facility], regarding patient's passport assessment, and when services will be starting. CC Plan: Will await call back from passport confirming services. Will continue to follow closely. Care Coordination Interventions    Program Enrollment:  Rising Risk  Referral from Primary Care Provider:  No  Suggested Interventions and Community Resources  Fall Risk Prevention:  Completed (Comment: Mailed fall precautions)  Home Care Waiver: In Process  Home Health Services:  Declined  Life Skills Coaching:  Completed (Comment: patient has  through [facility])  Meals on Wheels:  Declined  Physical Therapy:  Declined  Specialty Services Referral:  Completed (Comment: podiatry, Dr. Larissa Vidales, Dr. Bibi Pate)  Transportation Support:  Completed  Zone Management Tools:  Completed (Comment: mailed to patient today 01/23/18)         Prior to Admission medications    Medication Sig Start Date End Date Taking?  Authorizing Provider   acetaminophen (TYLENOL) 325 MG tablet Take 2 tablets by mouth every 4 hours as needed for Pain or Fever 1/29/18   Rosalina Hancock MD   divalproex (DEPAKOTE ER) 500 MG extended release tablet Take 1,000 mg by mouth daily    Historical Provider, MD   primidone (MYSOLINE) 50 MG tablet Take 50 mg by mouth nightly Per Dr. Juanita Shaw; 1/2-1 tablet nightly    Historical Provider, MD   glucose blood VI test strips (FREESTYLE LITE) strip Test sugar once daily and as needed 11/27/17   Tomás Saravia A Jump, DO   gabapentin (NEURONTIN) 300 MG capsule Take 1 capsule by mouth daily 10/5/17   Hermon Benders A Jump, DO   Lancets MISC Test blood sugar once daily and as needed( ONE TOUCH ULTRA DELICA) 2/38/39   Madhav Lions Jump, DO   tamsulosin (FLOMAX) 0.4 MG capsule Take 1 capsule by mouth every evening 6/15/17   Dakotah Vázquez PA-C   finasteride (PROSCAR) 5 MG tablet take 1 tablet by mouth daily 6/5/17   Dakotah Vázquez PA-C   benztropine (COGENTIN) 1 MG tablet Take 0.5 mg by mouth 2 times daily Per Dr. Michael Razo Provider, MD   risperiDONE (RISPERDAL) 1 MG tablet Take 1 mg by mouth Take in AM 11/20/15   Historical Provider, MD   risperiDONE (RISPERDAL) 2 MG tablet Take 2 mg by mouth nightly  10/12/15   Historical Provider, MD   clonazePAM (KLONOPIN) 1 MG tablet Take 1 tablet by mouth 2 times daily. 11/24/14   Gabi Donahue DO   Lancet Device MISC Delico Lancet Device. Use daily 3/17/14   Cydney Barron, DO   Blood Glucose Monitoring Suppl KELLY Please dispense One Touch Ultra meter 3/6/14   Cydney Barron, DO   propranolol (INDERAL) 40 MG tablet Take 40 mg by mouth 2 times daily.       Historical Provider, MD       Future Appointments  Date Time Provider Dnaiela Canales   3/23/2018 1:30 PM [facility]  None   4/5/2018 11:00 AM [facility]   6/21/2018 2:00 PM Raymundo Bliss MD [facility]

## 2018-02-22 ENCOUNTER — TELEPHONE (OUTPATIENT)
Dept: FAMILY MEDICINE CLINIC | Age: 63
End: 2018-02-22

## 2018-02-22 NOTE — TELEPHONE ENCOUNTER
Add 57289 Cpt? (Important Note: In 2017 The Use Of 09130 Is Being Tracked By Cms To Determine Future Global Period Reimbursement For Global Periods): no
Ok thanks
Detail Level: Detailed

## 2018-02-26 ENCOUNTER — CARE COORDINATION (OUTPATIENT)
Dept: CARE COORDINATION | Age: 63
End: 2018-02-26

## 2018-02-26 NOTE — CARE COORDINATION
Declined  Physical Therapy:  Declined  Specialty Services Referral:  Completed (Comment: podiatry, Dr. Stephanie Owen, Dr. Alexey Calderón)  Transportation Support:  Completed  Zone Management Tools:  Completed (Comment: mailed to patient today 01/23/18)           Prior to Admission medications    Medication Sig Start Date End Date Taking? Authorizing Provider   acetaminophen (TYLENOL) 325 MG tablet Take 2 tablets by mouth every 4 hours as needed for Pain or Fever 1/29/18   Gema Hancock MD   divalproex (DEPAKOTE ER) 500 MG extended release tablet Take 1,000 mg by mouth daily    Historical Provider, MD   primidone (MYSOLINE) 50 MG tablet Take 50 mg by mouth nightly Per Dr. Sera Robertson; 1/2-1 tablet nightly    Historical Provider, MD   glucose blood VI test strips (FREESTYLE LITE) strip Test sugar once daily and as needed 11/27/17   Venia Rex A Jump, DO   gabapentin (NEURONTIN) 300 MG capsule Take 1 capsule by mouth daily 10/5/17   Venia Rex A Jump, DO   Lancets MISC Test blood sugar once daily and as needed( ONE TOUCH ULTRA DELICA) 1/57/12   Elia Renato Jump, DO   tamsulosin (FLOMAX) 0.4 MG capsule Take 1 capsule by mouth every evening 6/15/17   Zoe Allred PA-C   finasteride (PROSCAR) 5 MG tablet take 1 tablet by mouth daily 6/5/17   Zoe Allred PA-C   benztropine (COGENTIN) 1 MG tablet Take 0.5 mg by mouth 2 times daily Per Dr. Mayra Emmanuel Provider, MD   risperiDONE (RISPERDAL) 1 MG tablet Take 1 mg by mouth Take in AM 11/20/15   Historical Provider, MD   risperiDONE (RISPERDAL) 2 MG tablet Take 2 mg by mouth nightly  10/12/15   Historical Provider, MD   clonazePAM (KLONOPIN) 1 MG tablet Take 1 tablet by mouth 2 times daily. 11/24/14   Ralph Hernandez DO   Lancet Device MISC Delico Lancet Device. Use daily 3/17/14   Venia Rex A Jump, DO   Blood Glucose Monitoring Suppl KELLY Please dispense One Touch Ultra meter 3/6/14   Venia Rex A Jump, DO   propranolol (INDERAL) 40 MG tablet Take 40 mg by mouth 2 times daily.       Historical

## 2018-02-28 ENCOUNTER — CARE COORDINATION (OUTPATIENT)
Dept: CARE COORDINATION | Age: 63
End: 2018-02-28

## 2018-03-02 ENCOUNTER — TELEPHONE (OUTPATIENT)
Dept: FAMILY MEDICINE CLINIC | Age: 63
End: 2018-03-02

## 2018-03-02 ENCOUNTER — CARE COORDINATION (OUTPATIENT)
Dept: CARE COORDINATION | Age: 63
End: 2018-03-02

## 2018-03-14 ENCOUNTER — CARE COORDINATION (OUTPATIENT)
Dept: CARE COORDINATION | Age: 63
End: 2018-03-14

## 2018-03-21 ENCOUNTER — CARE COORDINATION (OUTPATIENT)
Dept: CARE COORDINATION | Age: 63
End: 2018-03-21

## 2018-03-21 NOTE — CARE COORDINATION
1/29/18   Gallito Hancock MD   divalproex (DEPAKOTE ER) 500 MG extended release tablet Take 1,000 mg by mouth daily    Historical Provider, MD   primidone (MYSOLINE) 50 MG tablet Take 50 mg by mouth nightly Per Dr. Chanell Montoya; 1/2-1 tablet nightly    Historical Provider, MD   glucose blood VI test strips (FREESTYLE LITE) strip Test sugar once daily and as needed 11/27/17   Manny Barron, DO   gabapentin (NEURONTIN) 300 MG capsule Take 1 capsule by mouth daily 10/5/17   Manny Prescott A Anderson, DO   Lancets MISC Test blood sugar once daily and as needed( ONE TOUCH ULTRA DELICA) 2/82/72   Bauman Monica Jump, DO   tamsulosin (FLOMAX) 0.4 MG capsule Take 1 capsule by mouth every evening 6/15/17   Tiffany Chanel PA-C   finasteride (PROSCAR) 5 MG tablet take 1 tablet by mouth daily 6/5/17   Tiffany Chanel PA-C   benztropine (COGENTIN) 1 MG tablet Take 0.5 mg by mouth 2 times daily Per Dr. Cady Hagen Provider, MD   risperiDONE (RISPERDAL) 1 MG tablet Take 1 mg by mouth Take in AM 11/20/15   Historical Provider, MD   risperiDONE (RISPERDAL) 2 MG tablet Take 2 mg by mouth nightly  10/12/15   Historical Provider, MD   clonazePAM (KLONOPIN) 1 MG tablet Take 1 tablet by mouth 2 times daily. 11/24/14   Blease Beam, DO   Lancet Device [facility] [place] Lancet Device. Use daily 3/17/14   Manny Barron, DO   Blood Glucose Monitoring Suppl KELLY Please dispense One Touch Ultra meter 3/6/14   Manny Barron, DO   propranolol (INDERAL) 40 MG tablet Take 40 mg by mouth 2 times daily.       Historical Provider, MD       Future Appointments  Date Time Provider Daniela Canales   4/5/2018 11:00 AM El Raymond DO [facility]   6/21/2018 2:00 PM Tyler Braswell MD [facility]

## 2018-04-10 ENCOUNTER — CARE COORDINATION (OUTPATIENT)
Dept: CARE COORDINATION | Age: 63
End: 2018-04-10

## 2018-04-11 PROBLEM — E86.0 DEHYDRATION: Status: RESOLVED | Noted: 2018-01-27 | Resolved: 2018-04-11

## 2018-04-19 ENCOUNTER — CARE COORDINATION (OUTPATIENT)
Dept: CARE COORDINATION | Age: 63
End: 2018-04-19

## 2018-05-10 ENCOUNTER — CARE COORDINATION (OUTPATIENT)
Dept: CARE COORDINATION | Age: 63
End: 2018-05-10

## 2018-05-21 ENCOUNTER — CARE COORDINATION (OUTPATIENT)
Dept: CARE COORDINATION | Age: 63
End: 2018-05-21

## 2018-06-07 ENCOUNTER — CARE COORDINATION (OUTPATIENT)
Dept: CARE COORDINATION | Age: 63
End: 2018-06-07

## 2018-06-21 ENCOUNTER — OFFICE VISIT (OUTPATIENT)
Dept: UROLOGY | Age: 63
End: 2018-06-21
Payer: MEDICARE

## 2018-06-21 ENCOUNTER — HOSPITAL ENCOUNTER (OUTPATIENT)
Age: 63
Setting detail: SPECIMEN
Discharge: HOME | End: 2018-06-21
Payer: MEDICARE

## 2018-06-21 VITALS
DIASTOLIC BLOOD PRESSURE: 72 MMHG | SYSTOLIC BLOOD PRESSURE: 110 MMHG | WEIGHT: 201 LBS | BODY MASS INDEX: 24.99 KG/M2 | HEIGHT: 75 IN

## 2018-06-21 DIAGNOSIS — R33.9 URINARY RETENTION: Primary | ICD-10-CM

## 2018-06-21 DIAGNOSIS — N40.1 BENIGN NON-NODULAR PROSTATIC HYPERPLASIA WITH LOWER URINARY TRACT SYMPTOMS: ICD-10-CM

## 2018-06-21 DIAGNOSIS — R33.9 URINARY RETENTION: ICD-10-CM

## 2018-06-21 DIAGNOSIS — N39.41 URGE INCONTINENCE: ICD-10-CM

## 2018-06-21 LAB
-: NORMAL
AMORPHOUS: NORMAL
BACTERIA: NORMAL
BILIRUBIN URINE: NEGATIVE
CASTS UA: NORMAL /LPF
COLOR: YELLOW
COMMENT UA: NORMAL
CRYSTALS, UA: NORMAL /HPF
EPITHELIAL CELLS UA: NORMAL /HPF
GLUCOSE URINE: NEGATIVE
KETONES, URINE: NEGATIVE
LEUKOCYTE ESTERASE, URINE: NEGATIVE
MUCUS: NORMAL
NITRITE, URINE: NEGATIVE
OTHER OBSERVATIONS UA: NORMAL
PH UA: 5 (ref 5–8)
PROTEIN UA: NEGATIVE
RBC UA: NORMAL /HPF (ref 0–2)
RENAL EPITHELIAL, UA: NORMAL /HPF
SPECIFIC GRAVITY UA: 1.01 (ref 1–1.03)
TRICHOMONAS: NORMAL
TURBIDITY: CLEAR
URINE HGB: NEGATIVE
UROBILINOGEN, URINE: NORMAL
WBC UA: NORMAL /HPF
YEAST: NORMAL

## 2018-06-21 PROCEDURE — 1036F TOBACCO NON-USER: CPT | Performed by: NURSE PRACTITIONER

## 2018-06-21 PROCEDURE — 81001 URINALYSIS AUTO W/SCOPE: CPT

## 2018-06-21 PROCEDURE — 51798 US URINE CAPACITY MEASURE: CPT | Performed by: NURSE PRACTITIONER

## 2018-06-21 PROCEDURE — 87086 URINE CULTURE/COLONY COUNT: CPT

## 2018-06-21 PROCEDURE — G8417 CALC BMI ABV UP PARAM F/U: HCPCS | Performed by: NURSE PRACTITIONER

## 2018-06-21 PROCEDURE — G8427 DOCREV CUR MEDS BY ELIG CLIN: HCPCS | Performed by: NURSE PRACTITIONER

## 2018-06-21 PROCEDURE — 3017F COLORECTAL CA SCREEN DOC REV: CPT | Performed by: NURSE PRACTITIONER

## 2018-06-21 PROCEDURE — 99214 OFFICE O/P EST MOD 30 MIN: CPT | Performed by: NURSE PRACTITIONER

## 2018-06-21 ASSESSMENT — ENCOUNTER SYMPTOMS
COUGH: 0
WHEEZING: 0
VOMITING: 0
COLOR CHANGE: 0
ABDOMINAL PAIN: 0
EYE PAIN: 0
NAUSEA: 0
SHORTNESS OF BREATH: 0
EYE REDNESS: 0
CONSTIPATION: 0
BACK PAIN: 0

## 2018-06-22 LAB
CULTURE: NORMAL
Lab: NORMAL
SPECIMEN DESCRIPTION: NORMAL
STATUS: NORMAL

## 2018-06-25 RX ORDER — TAMSULOSIN HYDROCHLORIDE 0.4 MG/1
0.4 CAPSULE ORAL EVERY EVENING
Qty: 30 CAPSULE | Refills: 11 | Status: SHIPPED | OUTPATIENT
Start: 2018-06-25 | End: 2019-07-16 | Stop reason: SDUPTHER

## 2018-06-25 RX ORDER — FINASTERIDE 5 MG/1
TABLET, FILM COATED ORAL
Qty: 30 TABLET | Refills: 11 | Status: SHIPPED | OUTPATIENT
Start: 2018-06-25 | End: 2019-07-16 | Stop reason: SDUPTHER

## 2018-06-29 ENCOUNTER — CARE COORDINATION (OUTPATIENT)
Dept: CARE COORDINATION | Age: 63
End: 2018-06-29

## 2018-07-02 ENCOUNTER — CARE COORDINATION (OUTPATIENT)
Dept: CARE COORDINATION | Age: 63
End: 2018-07-02

## 2018-07-02 NOTE — CARE COORDINATION
Attempted to return Nitin's call-he had left a voice message regarding billing and financial issues. Left voice message for him to return call to [phone]. Ambulatory Care Coordination Note  7/2/2018  CM Risk Score: 6  Carlos Mortality Risk Score:      ACC: Holger Jennings, RN    Summary Note: Tacho Avila called back, requesting assistance with getting financial aid for his $120 bill. He has a letter approving financial aid for [facility]) bills but lost the address of where to mail it. [address] phoned the [facility] and got the address and gave to Tacho Avila over the phone. Tacho Avila repeated it back correctly. [address] asked Tacho Avila how his blood sugars were and he stated that he hasn't checked them in a long time. He states he is feeling good, eating well and walking every day with his walker. He denies any recent falls. Tacho Avila states he is taking all meds as prescribed and needs no refills. He denies any other needs at this time. CC Plan: Follow up phone call in 3-4 weeks. Diabetes Assessment    Medic Alert ID:  No  Meal Planning:  Avoidance of concentrated sweets   How often do you test your blood sugar?:  Daily   Do you have barriers with adherence to non-pharmacologic self-management interventions? (Nutrition/Exercise/Self-Monitoring):  No   Have you ever had to go to the ED for symptoms of low blood sugar?:  No              Care Coordination Interventions    Program Enrollment:  Complex Care  Referral from Primary Care Provider:  No  Suggested Interventions and [employer] on Wheels:  Completed         Goals Addressed             Most Recent     Conditions and Symptoms   On track (7/2/2018)             I will schedule office visits, as directed by my provider. I will keep my appointment or reschedule if I have to cancel. I will notify my provider of any barriers to my plan of care. I will notify my provider of any symptoms that indicate a worsening of my condition.     Barriers: lack of support and overwhelmed [facility]BILE  None   8/2/2018 3:00  [address]  None   8/2/2018 3:00 PM Riaz Washburn MD [facility]

## 2018-07-05 ENCOUNTER — OFFICE VISIT (OUTPATIENT)
Dept: FAMILY MEDICINE CLINIC | Age: 63
End: 2018-07-05
Payer: MEDICARE

## 2018-07-05 VITALS
HEIGHT: 75 IN | SYSTOLIC BLOOD PRESSURE: 116 MMHG | HEART RATE: 66 BPM | WEIGHT: 199 LBS | OXYGEN SATURATION: 98 % | DIASTOLIC BLOOD PRESSURE: 70 MMHG | BODY MASS INDEX: 24.74 KG/M2

## 2018-07-05 DIAGNOSIS — R26.89 LOSS OF BALANCE: ICD-10-CM

## 2018-07-05 DIAGNOSIS — I10 ESSENTIAL HYPERTENSION, BENIGN: Primary | ICD-10-CM

## 2018-07-05 DIAGNOSIS — E11.9 TYPE 2 DIABETES MELLITUS WITHOUT COMPLICATION, WITHOUT LONG-TERM CURRENT USE OF INSULIN (HCC): ICD-10-CM

## 2018-07-05 PROBLEM — E44.1 MILD MALNUTRITION (HCC): Status: RESOLVED | Noted: 2018-01-22 | Resolved: 2018-07-05

## 2018-07-05 PROCEDURE — 3044F HG A1C LEVEL LT 7.0%: CPT | Performed by: FAMILY MEDICINE

## 2018-07-05 PROCEDURE — 99214 OFFICE O/P EST MOD 30 MIN: CPT | Performed by: FAMILY MEDICINE

## 2018-07-05 PROCEDURE — G8427 DOCREV CUR MEDS BY ELIG CLIN: HCPCS | Performed by: FAMILY MEDICINE

## 2018-07-05 PROCEDURE — 3017F COLORECTAL CA SCREEN DOC REV: CPT | Performed by: FAMILY MEDICINE

## 2018-07-05 PROCEDURE — 1036F TOBACCO NON-USER: CPT | Performed by: FAMILY MEDICINE

## 2018-07-05 PROCEDURE — 2022F DILAT RTA XM EVC RTNOPTHY: CPT | Performed by: FAMILY MEDICINE

## 2018-07-05 PROCEDURE — G8420 CALC BMI NORM PARAMETERS: HCPCS | Performed by: FAMILY MEDICINE

## 2018-07-05 RX ORDER — GABAPENTIN 300 MG/1
300 CAPSULE ORAL DAILY
Qty: 90 CAPSULE | Refills: 1 | Status: SHIPPED | OUTPATIENT
Start: 2018-07-05 | End: 2019-01-01

## 2018-07-05 ASSESSMENT — ENCOUNTER SYMPTOMS
BLOOD IN STOOL: 0
CONSTIPATION: 0
ORTHOPNEA: 0
DIARRHEA: 0
EYE DISCHARGE: 0
SHORTNESS OF BREATH: 0
TROUBLE SWALLOWING: 0
EYE REDNESS: 0
COUGH: 0

## 2018-07-05 NOTE — PROGRESS NOTES
11/27/17  Yes Ambrosio Barron, DO   Lancets MISC Test blood sugar once daily and as needed( ONE TOUCH ULTRA DELICA) 4[birth year]57  Yes Ambrosio Barron, DO   benztropine (COGENTIN) 1 MG tablet Take 0.5 mg by mouth 2 times daily Per Dr. Noemy Fulton   Yes Historical Provider, MD   risperiDONE (RISPERDAL) 2 MG tablet Take 2 mg by mouth nightly  10/12/15  Yes Historical Provider, MD   clonazePAM (KLONOPIN) 1 MG tablet Take 1 tablet by mouth 2 times daily. 11/24/14  Yes Clyda Juan Jump, DO   Lancet Device MISC Delico Lancet Device. Use daily 3/17/14  Yes Ambrosio Barron, DO   Blood Glucose Monitoring Suppl KELLY Please dispense One Touch Ultra meter 3/6/14  Yes Ambrosio Barron, DO   propranolol (INDERAL) 40 MG tablet Take 40 mg by mouth 2 times daily. Yes Historical Provider, MD        Allergies:       Patient has no known allergies. Social History:     Tobacco:    reports that he has never smoked. He has never used smokeless tobacco.  Alcohol:      reports that he does not drink alcohol. Drug Use:  reports that he does not use drugs. Family History:     Family History   Problem Relation Age of Onset   [place] Diabetes Father     Diabetes Sister     Diabetes Brother     Diabetes Sister     Arthritis Mother        Review of Systems:       Review of Systems   Constitutional: Negative for chills, fatigue and weight loss. HENT: Negative for congestion and trouble swallowing. Eyes: Negative for discharge and redness. Respiratory: Negative for cough and shortness of breath. Cardiovascular: Positive for leg swelling. Negative for chest pain, palpitations and orthopnea. Gastrointestinal: Negative for blood in stool, constipation and diarrhea. Endocrine: Negative for polydipsia and polyuria. Genitourinary: Positive for frequency. Negative for dysuria. Pt sees Dr Carlitos Herrera, urology   Musculoskeletal: Negative for joint swelling and neck stiffness. Skin: Negative for pallor and rash.    Neurological: Negative for dizziness, tremors and light-headedness. Psychiatric/Behavioral: Negative for confusion and sleep disturbance. Physical Exam:     Physical Exam   Constitutional: He is oriented to person, place, and time. He appears well-developed and well-nourished. HENT:   Head: Normocephalic and atraumatic. Eyes: Conjunctivae are normal. Pupils are equal, round, and reactive to light. Right eye exhibits no discharge. Left eye exhibits no discharge. Neck: Neck supple. No thyromegaly present. Cardiovascular: Normal rate, regular rhythm, normal heart sounds and intact distal pulses. No murmur heard. Bilateral LE trace swelling but NO calf pain. Pulmonary/Chest: Effort normal and breath sounds normal. No respiratory distress. He has no wheezes. Abdominal: Soft. Bowel sounds are normal. He exhibits no distension. There is no tenderness. Musculoskeletal: He exhibits no edema, tenderness or deformity. Lymphadenopathy:     He has no cervical adenopathy. Neurological: He is alert and oriented to person, place, and time. Pt uses a cane and shuffles when he walks   Skin: No rash noted. No erythema. Gross sensation intact and no lesions or sores on feet bilateral. +2 DP pulse bilateral.     Psychiatric: He has a normal mood and affect. His behavior is normal.   Vitals reviewed.       Vitals:  /70   Pulse 66   Ht 6' 3\" (1.905 m)   Wt 199 lb (90.3 kg)   SpO2 98%   BMI 24.87 kg/m²       Data:     Lab Results   Component Value Date     01/29/2018    K 3.7 01/29/2018     01/29/2018    CO2 24 01/29/2018    BUN 15 01/29/2018    CREATININE 0.73 01/29/2018    GLUCOSE 110 01/29/2018    GLUCOSE 105 09/09/2011    PROT 6.7 01/27/2018    LABALBU 3.8 01/27/2018    LABALBU 4.0 05/25/2012    BILITOT 0.72 01/27/2018    ALKPHOS 50 01/27/2018    AST 27 01/27/2018    ALT 24 01/27/2018     Lab Results   Component Value Date    WBC 6.7 01/27/2018    RBC 4.24 01/27/2018    RBC 4.51 05/05/2012    HGB 13.0 01/27/2018    HCT Total Score Depression Severity: 1-4 = Minimal depression, 5-9 = Mild depression, 10-14 = Moderate depression, 15-19 = Moderately severe depression, 20-27 = Severe depression      Return in about 6 months (around 1/5/2019) for HTN, DM.       Electronically signed by Zachary Payne MD on 7/5/2018 at 1:19 PM

## 2018-07-30 ENCOUNTER — CARE COORDINATION (OUTPATIENT)
Dept: CARE COORDINATION | Age: 63
End: 2018-07-30

## 2018-07-31 ENCOUNTER — OFFICE VISIT (OUTPATIENT)
Dept: SURGERY | Age: 63
End: 2018-07-31

## 2018-07-31 VITALS — HEIGHT: 75 IN | BODY MASS INDEX: 24.37 KG/M2 | RESPIRATION RATE: 18 BRPM | WEIGHT: 196 LBS

## 2018-07-31 DIAGNOSIS — Z86.010 HISTORY OF COLON POLYPS: Primary | ICD-10-CM

## 2018-07-31 PROCEDURE — 99999 PR OFFICE/OUTPT VISIT,PROCEDURE ONLY: CPT | Performed by: SURGERY

## 2018-07-31 RX ORDER — SODIUM, POTASSIUM,MAG SULFATES 17.5-3.13G
1 SOLUTION, RECONSTITUTED, ORAL ORAL ONCE
Qty: 2 BOTTLE | Refills: 0 | Status: SHIPPED | OUTPATIENT
Start: 2018-07-31 | End: 2018-07-31

## 2018-08-02 ENCOUNTER — HOSPITAL ENCOUNTER (OUTPATIENT)
Age: 63
Discharge: HOME | End: 2018-08-02
Payer: MEDICARE

## 2018-08-02 ENCOUNTER — OFFICE VISIT (OUTPATIENT)
Dept: UROLOGY | Age: 63
End: 2018-08-02
Payer: MEDICARE

## 2018-08-02 VITALS
HEIGHT: 74 IN | DIASTOLIC BLOOD PRESSURE: 74 MMHG | WEIGHT: 200 LBS | BODY MASS INDEX: 25.67 KG/M2 | SYSTOLIC BLOOD PRESSURE: 102 MMHG

## 2018-08-02 DIAGNOSIS — N40.1 BENIGN NON-NODULAR PROSTATIC HYPERPLASIA WITH LOWER URINARY TRACT SYMPTOMS: ICD-10-CM

## 2018-08-02 DIAGNOSIS — R33.9 URINARY RETENTION: Primary | ICD-10-CM

## 2018-08-02 DIAGNOSIS — Z12.5 PROSTATE CANCER SCREENING: ICD-10-CM

## 2018-08-02 LAB — PROSTATE SPECIFIC ANTIGEN: 0.18 UG/L

## 2018-08-02 PROCEDURE — G0103 PSA SCREENING: HCPCS

## 2018-08-02 PROCEDURE — 51798 US URINE CAPACITY MEASURE: CPT | Performed by: NURSE PRACTITIONER

## 2018-08-02 PROCEDURE — 99213 OFFICE O/P EST LOW 20 MIN: CPT | Performed by: NURSE PRACTITIONER

## 2018-08-02 PROCEDURE — 1036F TOBACCO NON-USER: CPT | Performed by: NURSE PRACTITIONER

## 2018-08-02 PROCEDURE — 3017F COLORECTAL CA SCREEN DOC REV: CPT | Performed by: NURSE PRACTITIONER

## 2018-08-02 PROCEDURE — G8417 CALC BMI ABV UP PARAM F/U: HCPCS | Performed by: NURSE PRACTITIONER

## 2018-08-02 PROCEDURE — G8427 DOCREV CUR MEDS BY ELIG CLIN: HCPCS | Performed by: NURSE PRACTITIONER

## 2018-08-02 PROCEDURE — 36415 COLL VENOUS BLD VENIPUNCTURE: CPT

## 2018-08-02 ASSESSMENT — ENCOUNTER SYMPTOMS
SHORTNESS OF BREATH: 0
ABDOMINAL PAIN: 0
VOMITING: 0
CONSTIPATION: 0
WHEEZING: 0
COUGH: 0
EYE REDNESS: 0
EYE PAIN: 0
NAUSEA: 0
BACK PAIN: 0
COLOR CHANGE: 0

## 2018-08-02 NOTE — PROGRESS NOTES
Subjective:      Patient ID: Willard Romero is a 59 y.o. male. HPI  Patient is a 59 y.o. male in no acute distress and alert and oriented to person, place and time. Scrotal irritation in past. We had him use ketoconazole and recommended good hygiene. The scrotal area improved. 12/2013 Flomax started for BPH with LUTS.     5/2016 Proscar added    Frequency/urgency so we started Ditropan XL. Provided significant symptom relief, but PVR >500 mL so we dc'd it.      TODAY  Here today to follow-up for BPH and urinary retention. He continues to take Flomax and Proscar both. Urine culture negative. Random bladder scan today is 160ml, he urinated >45 minutes ago. He denies constipation. He denies dysuria or gross hematuria. He has stopped drinking soda as instructed and has had significant improvement in frequency, urgency, and incontinence. IPSS Questionnaire (AUA-7): Over the past month    1)  How often have you had a sensation of not emptying your bladder completely after you finish urinating? 2 - Less than half the time   2)  How often have you had to urinate again less than two hours after you finished urinating? 2 - Less than half the time   3)  How often have you found you stopped and started again several times when you urinated? 1 - Less than 1 time in 5   4) How difficult have you found it to postpone urination? 2 - Less than half the time   5) How often have you had a weak urinary stream?  0 - Not at all   6) How often have you had to push or strain to begin urination? 0 - Not at all   7) How many times did you most typically get up to urinate from the time you went to bed until the time you got up in the morning?   1 - 1 time   Total 8   QOL 2       Past Medical History:   Diagnosis Date    Chronic schizophrenic ([facility]     Hypertension     Neuromuscular disorder ([facility]     Type II or unspecified type diabetes mellitus without mention of complication, not stated as uncontrolled      Past

## 2018-08-06 ENCOUNTER — TELEPHONE (OUTPATIENT)
Dept: FAMILY MEDICINE CLINIC | Age: 63
End: 2018-08-06

## 2018-08-06 ENCOUNTER — ANESTHESIA EVENT (OUTPATIENT)
Dept: OPERATING ROOM | Age: 63
End: 2018-08-06
Payer: MEDICARE

## 2018-08-06 ASSESSMENT — ENCOUNTER SYMPTOMS
VOMITING: 0
ABDOMINAL PAIN: 0
COUGH: 0
CHOKING: 0
BACK PAIN: 0
SHORTNESS OF BREATH: 0
SORE THROAT: 0
BLOOD IN STOOL: 0
TROUBLE SWALLOWING: 0
NAUSEA: 0

## 2018-08-06 NOTE — TELEPHONE ENCOUNTER
Gian Johnson said last week he had to get blood work done and noticed that his blood was thick and hard to get up in the tube. He wanted to know if he should be concerned that his blood is too thick. Please let Gian Johnson know. Next Visit Date:  Future Appointments  Date Time Provider Daniela Ally   8/7/2018 9:00 AM [facility] UN[facility]  None   [facility]14/2018 [facility]30 [facility]   8/14/2018 1:30 PM [facility]AN UNIT [facility]  None   9/4/2018 1:30 PM [facility] UNIT [facility]  None   10/17/2018 12:20 PM [facility] UNIT [facility]  None   10/17/2018 12:30 PM Shayy Werner MD [facility]   2/7/2019 2:00 PM Albania Shaw MD [facility]       Health Maintenance   Topic Date Due    Hepatitis C screen  1955    HIV screen  05/27/1971    DTaP/Tdap/Td vaccine (1 - Tdap) 05/27/1975    Shingles Vaccine (1 of 2 - 2 Dose Series) 05/27/2006    Diabetic microalbuminuria test  04/04/2018    Flu vaccine (1) 09/01/2018    Diabetic retinal exam  09/22/2018    Lipid screen  10/05/2018    A1C test (Diabetic or Prediabetic)  01/20/2019    Potassium monitoring  01/29/2019    Creatinine monitoring  01/29/2019    Diabetic foot exam  07/05/2019    Colon cancer screen colonoscopy  01/07/2024    Pneumococcal med risk  Completed       Hemoglobin A1C (%)   Date Value   01/20/2018 5.7   10/05/2017 5.9   04/04/2017 6.4             ( goal A1C is < 7)   Microalb/Crt.  Ratio (mcg/mg creat)   Date Value   03/09/2016 10     LDL Cholesterol (mg/dL)   Date Value   10/05/2017 64   10/05/2017 64       (goal LDL is <100)   AST (U/L)   Date Value   01/27/2018 27     ALT (U/L)   Date Value   01/27/2018 24     BUN (mg/dL)   Date Value   01/29/2018 15     BP Readings from Last 3 Encounters:   08/02/18 102/74   07/05/18 116/70   06/21/18 110/72          (goal 120/80)    All Future Testing planned in [facility]  Lab Frequency Next Occurrence               Patient Active Problem List: Type 2 diabetes mellitus (HCC)     Essential hypertension, benign     Hematuria     Lower urinary tract symptoms (LUTS)     BPH (benign prostatic hyperplasia)     Microscopic hematuria     Yeast dermatitis of penis     Blood in stool     Scrotal irritation     Non-traumatic rhabdomyolysis     Generalized weakness     Benign non-nodular prostatic hyperplasia with lower urinary tract symptoms     Urinary retention     Rhabdomyolysis

## 2018-08-06 NOTE — PROGRESS NOTES
round, and reactive to light. No scleral icterus. Neck: Normal range of motion. Neck supple. No JVD present. No tracheal deviation present. Cardiovascular: Normal rate and regular rhythm. Pulmonary/Chest: Effort normal and breath sounds normal. No respiratory distress. He exhibits no tenderness. Abdominal: Soft. Bowel sounds are normal. He exhibits no distension and no mass. There is no tenderness. There is no rebound and no guarding. Musculoskeletal: Normal range of motion. He exhibits no edema. Lymphadenopathy:     He has no cervical adenopathy. Neurological: He is alert and oriented to person, place, and time. No cranial nerve deficit. Skin: Skin is warm and dry. No rash noted. No erythema. Psychiatric: He has a normal mood and affect. His behavior is normal. Judgment and thought content normal.   Nursing note and vitals reviewed. Result Date - 1/9/2014     Component Results     Component Collected Lab   Surgical Pathology Report 01/07/2014 12:21 PM [address] [facility]   (NOTE)  [address]  [phone]  Fax: [phone]  SURGICAL PATHOLOGY REPORT    Patient Name: Breonna Soriano  MR#: [id number]  Specimen #      Final Diagnosis  POLYP 22 CM:    Krish Cruz M.D.  **Electronically Signed Out**         df/1/9/2014    Clinical Information  RECTAL BLEEDING; CHANGES BOWEL HABITS; HX COLON POLYPS    Pre-Operative Diagnosis  COLONOSCOPY; POLYPECTOMY      Source:  A: POLYP 22 CM    Gross Description  Specimen is labeled as polyp 22 cm.  Specimen consists of 3 irregular  light gray-tan portions of soft tissue; measures from less than 0.1 to  0.1 cm in greatest dimension.  Submitted entirely.  PK    Microscopic Description  Sections from above reveal fragmented portions of colonic mucosa  showing a small fragment with mucosa and

## 2018-08-06 NOTE — PATIENT INSTRUCTIONS
is worse than the test. It may be uncomfortable, and you may feel hungry on the clear liquid diet. Some people do not go to work or do their usual activities on the day of the prep. Arrange to have someone take you home after the test.  What can you expect after a colonoscopy? The nurses will watch you for 1 to 2 hours until the medicines wear off. Then you can go home. You will need a ride. Your doctor will tell you when you can eat and do your usual activities. Your doctor will talk to you about when you will need your next colonoscopy. The results of your test and your risk for colorectal cancer will help your doctor decide how often you need to be checked. Follow-up care is a key part of your treatment and safety. Be sure to make and go to all appointments, and call your doctor if you are having problems. It's also a good idea to know your test results and keep a list of the medicines you take. Where can you learn more? Go to [email or web address] and sign in to your [employer] account. Enter H898 in the [facility] box to learn more about \"Learning About Colonoscopy. \"     If you do not have an account, please click on the \"Sign Up Now\" link. Current as of: May 12, 2017  Content Version: 11.6  © 8195-5799 [employer]. Care instructions adapted under license by [facility] ([facility]). If you have questions about a medical condition or this instruction, always ask your healthcare professional. [place] [address] any warranty or liability for your use of this information.

## 2018-08-07 ENCOUNTER — ANESTHESIA (OUTPATIENT)
Dept: OPERATING ROOM | Age: 63
End: 2018-08-07
Payer: MEDICARE

## 2018-08-07 ENCOUNTER — HOSPITAL ENCOUNTER (OUTPATIENT)
Age: 63
Setting detail: OUTPATIENT SURGERY
Discharge: HOME | End: 2018-08-07
Attending: SURGERY | Admitting: SURGERY
Payer: MEDICARE

## 2018-08-07 VITALS
HEART RATE: 67 BPM | WEIGHT: 196 LBS | DIASTOLIC BLOOD PRESSURE: 86 MMHG | TEMPERATURE: 98 F | RESPIRATION RATE: 18 BRPM | BODY MASS INDEX: 27.44 KG/M2 | HEIGHT: 71 IN | OXYGEN SATURATION: 99 % | SYSTOLIC BLOOD PRESSURE: 138 MMHG

## 2018-08-07 VITALS
RESPIRATION RATE: 9 BRPM | OXYGEN SATURATION: 100 % | DIASTOLIC BLOOD PRESSURE: 59 MMHG | SYSTOLIC BLOOD PRESSURE: 81 MMHG | TEMPERATURE: 98.6 F

## 2018-08-07 PROBLEM — Z86.010 HISTORY OF COLON POLYPS: Status: ACTIVE | Noted: 2018-08-07

## 2018-08-07 LAB — GLUCOSE BLD-MCNC: 109 MG/DL (ref 65–99)

## 2018-08-07 PROCEDURE — 3609010600 HC COLONOSCOPY POLYPECTOMY SNARE/COLD BIOPSY: Performed by: SURGERY

## 2018-08-07 PROCEDURE — 3700000001 HC ADD 15 MINUTES (ANESTHESIA): Performed by: SURGERY

## 2018-08-07 PROCEDURE — 3700000000 HC ANESTHESIA ATTENDED CARE: Performed by: SURGERY

## 2018-08-07 PROCEDURE — 88305 TISSUE EXAM BY PATHOLOGIST: CPT

## 2018-08-07 PROCEDURE — 7100000011 HC PHASE II RECOVERY - ADDTL 15 MIN: Performed by: SURGERY

## 2018-08-07 PROCEDURE — 7100000010 HC PHASE II RECOVERY - FIRST 15 MIN: Performed by: SURGERY

## 2018-08-07 PROCEDURE — 82947 ASSAY GLUCOSE BLOOD QUANT: CPT

## 2018-08-07 PROCEDURE — 2500000003 HC RX 250 WO HCPCS: Performed by: NURSE ANESTHETIST, CERTIFIED REGISTERED

## 2018-08-07 PROCEDURE — 6360000002 HC RX W HCPCS: Performed by: NURSE ANESTHETIST, CERTIFIED REGISTERED

## 2018-08-07 PROCEDURE — 2580000003 HC RX 258: Performed by: SURGERY

## 2018-08-07 PROCEDURE — C1773 RET DEV, INSERTABLE: HCPCS | Performed by: SURGERY

## 2018-08-07 PROCEDURE — 2709999900 HC NON-CHARGEABLE SUPPLY: Performed by: SURGERY

## 2018-08-07 RX ORDER — ACETAMINOPHEN 325 MG/1
650 TABLET ORAL EVERY 4 HOURS PRN
Status: DISCONTINUED | OUTPATIENT
Start: 2018-08-07 | End: 2018-08-07 | Stop reason: HOSPADM

## 2018-08-07 RX ORDER — FENTANYL CITRATE 50 UG/ML
INJECTION, SOLUTION INTRAMUSCULAR; INTRAVENOUS PRN
Status: DISCONTINUED | OUTPATIENT
Start: 2018-08-07 | End: 2018-08-07 | Stop reason: SDUPTHER

## 2018-08-07 RX ORDER — ONDANSETRON 2 MG/ML
4 INJECTION INTRAMUSCULAR; INTRAVENOUS EVERY 6 HOURS PRN
Status: DISCONTINUED | OUTPATIENT
Start: 2018-08-07 | End: 2018-08-07 | Stop reason: HOSPADM

## 2018-08-07 RX ORDER — SODIUM CHLORIDE, SODIUM LACTATE, POTASSIUM CHLORIDE, CALCIUM CHLORIDE 600; 310; 30; 20 MG/100ML; MG/100ML; MG/100ML; MG/100ML
INJECTION, SOLUTION INTRAVENOUS CONTINUOUS
Status: DISCONTINUED | OUTPATIENT
Start: 2018-08-07 | End: 2018-08-07 | Stop reason: HOSPADM

## 2018-08-07 RX ORDER — MIDAZOLAM HYDROCHLORIDE 1 MG/ML
INJECTION INTRAMUSCULAR; INTRAVENOUS PRN
Status: DISCONTINUED | OUTPATIENT
Start: 2018-08-07 | End: 2018-08-07 | Stop reason: SDUPTHER

## 2018-08-07 RX ORDER — 0.9 % SODIUM CHLORIDE 0.9 %
10 VIAL (ML) INJECTION PRN
Status: DISCONTINUED | OUTPATIENT
Start: 2018-08-07 | End: 2018-08-07 | Stop reason: HOSPADM

## 2018-08-07 RX ORDER — PROPOFOL 10 MG/ML
INJECTION, EMULSION INTRAVENOUS CONTINUOUS PRN
Status: DISCONTINUED | OUTPATIENT
Start: 2018-08-07 | End: 2018-08-07 | Stop reason: SDUPTHER

## 2018-08-07 RX ORDER — 0.9 % SODIUM CHLORIDE 0.9 %
10 VIAL (ML) INJECTION EVERY 12 HOURS SCHEDULED
Status: DISCONTINUED | OUTPATIENT
Start: 2018-08-07 | End: 2018-08-07 | Stop reason: HOSPADM

## 2018-08-07 RX ORDER — SODIUM CHLORIDE, SODIUM LACTATE, POTASSIUM CHLORIDE, CALCIUM CHLORIDE 600; 310; 30; 20 MG/100ML; MG/100ML; MG/100ML; MG/100ML
INJECTION, SOLUTION INTRAVENOUS CONTINUOUS
Status: DISCONTINUED | OUTPATIENT
Start: 2018-08-07 | End: 2018-08-07 | Stop reason: SDUPTHER

## 2018-08-07 RX ORDER — SODIUM CHLORIDE 0.9 % (FLUSH) 0.9 %
10 SYRINGE (ML) INJECTION EVERY 12 HOURS SCHEDULED
Status: DISCONTINUED | OUTPATIENT
Start: 2018-08-07 | End: 2018-08-07 | Stop reason: HOSPADM

## 2018-08-07 RX ORDER — SODIUM CHLORIDE 0.9 % (FLUSH) 0.9 %
10 SYRINGE (ML) INJECTION PRN
Status: DISCONTINUED | OUTPATIENT
Start: 2018-08-07 | End: 2018-08-07 | Stop reason: HOSPADM

## 2018-08-07 RX ORDER — GLYCOPYRROLATE 1 MG/5 ML
SYRINGE (ML) INTRAVENOUS PRN
Status: DISCONTINUED | OUTPATIENT
Start: 2018-08-07 | End: 2018-08-07 | Stop reason: SDUPTHER

## 2018-08-07 RX ORDER — LIDOCAINE HYDROCHLORIDE 10 MG/ML
INJECTION, SOLUTION INFILTRATION; PERINEURAL PRN
Status: DISCONTINUED | OUTPATIENT
Start: 2018-08-07 | End: 2018-08-07 | Stop reason: SDUPTHER

## 2018-08-07 RX ADMIN — LIDOCAINE HYDROCHLORIDE 5 ML: 10 INJECTION, SOLUTION INFILTRATION; PERINEURAL at 11:56

## 2018-08-07 RX ADMIN — MIDAZOLAM HYDROCHLORIDE 1 MG: 2 INJECTION, SOLUTION INTRAMUSCULAR; INTRAVENOUS at 11:56

## 2018-08-07 RX ADMIN — MIDAZOLAM HYDROCHLORIDE 1 MG: 2 INJECTION, SOLUTION INTRAMUSCULAR; INTRAVENOUS at 11:52

## 2018-08-07 RX ADMIN — FENTANYL CITRATE 50 MCG: 50 INJECTION, SOLUTION INTRAMUSCULAR; INTRAVENOUS at 11:52

## 2018-08-07 RX ADMIN — FENTANYL CITRATE 50 MCG: 50 INJECTION, SOLUTION INTRAMUSCULAR; INTRAVENOUS at 11:56

## 2018-08-07 RX ADMIN — Medication 0.2 MG: at 11:56

## 2018-08-07 RX ADMIN — SODIUM CHLORIDE, POTASSIUM CHLORIDE, SODIUM LACTATE AND CALCIUM CHLORIDE: 600; 310; 30; 20 INJECTION, SOLUTION INTRAVENOUS at 10:20

## 2018-08-07 RX ADMIN — PROPOFOL 100 MCG/KG/MIN: 10 INJECTION, EMULSION INTRAVENOUS at 11:56

## 2018-08-07 NOTE — ANESTHESIA PRE PROCEDURE
Department of Anesthesiology  Preprocedure Note       Name:  Darci Khan   Age:  59 y.o.  :  1955                                          MRN:  [id number]         Date:  2018      Surgeon: Maria C Caban):  Jannet Onofre MD    Procedure: Procedure(s):  COLONOSCOPY    Medications prior to admission:   Prior to Admission medications    Medication Sig Start Date End Date Taking? Authorizing Provider   gabapentin (NEURONTIN) 300 MG capsule Take 1 capsule by mouth daily for 180 days. . 18 Yes Joan Torres MD   tamsulosin (FLOMAX) 0.4 MG capsule Take 1 capsule by mouth every evening 18  Yes LYNETTE Kramer CNP   finasteride (PROSCAR) 5 MG tablet take 1 tablet by mouth daily 18  Yes LYNETTE Kramer CNP   divalproex (DEPAKOTE ER) 500 MG extended release tablet Take 1,000 mg by mouth daily   Yes Historical Provider, MD   primidone (MYSOLINE) 50 MG tablet Take 50 mg by mouth nightly Per Dr. Fatemeh De La O; 1 tablet at night   Yes Historical Provider, MD   benztropine (COGENTIN) 1 MG tablet Take 0.5 mg by mouth 2 times daily Per Dr. Napoleon Blandon   Yes Historical Provider, MD   risperiDONE (RISPERDAL) 2 MG tablet Take 2 mg by mouth nightly  10/12/15  Yes Historical Provider, MD   clonazePAM (KLONOPIN) 1 MG tablet Take 1 tablet by mouth 2 times daily. 14  Yes Ambrosio Barron, DO   propranolol (INDERAL) 40 MG tablet Take 40 mg by mouth 2 times daily. Yes Historical Provider, MD   glucose blood VI test strips (FREESTYLE LITE) strip Test sugar once daily and as needed 17   Dayanna Armstrong A Anderson, DO   Lancets MISC Test blood sugar once daily and as needed( ONE TOUCH ULTRA DELICA)    Saturnino Patel, DO   Lancet Device MISC Delico Lancet Device.    Use daily 3/17/14   Dayanna Armstrong A Jump, DO   Blood Glucose Monitoring Suppl KELLY Please dispense One Touch Ultra meter 3/6/14   Jf Sleet Anderson, DO       Current medications:    Current Facility-Administered Medications   Medication Dose Route

## 2018-08-07 NOTE — OP NOTE
splenic  flexure were all normal with the exception of diverticula. The descending  colon and sigmoid was remarkably redundant with extensive diverticulosis. Two small sessile polyps were removed from the sigmoid colon by hot snare  polypectomy. Upper, middle and lower portions of the rectum were normal.   On retroflexion, there were minimal internal hemorrhoids. The colon was  decompressed by suction as the scope was removed. The patient tolerated  the procedure well and was transferred to PACU in stable condition. SPECIMENS:  Sessile sigmoid polyps x2. DRAINS:  None. COMPLICATIONS:  None. DISPOSITION:  To PACU in awake, alert and stable following recovery. We  will discharge the patient home with gradual advancement of diet and  activity as tolerated with instructions for high-fiber, low-fat diet with  fiber supplementation. We will most likely recommend next screening  colonoscopy in 4-5 years pending final pathology. FOLLOWUP:  Will be with me in 1-2 weeks to review pathology with Dr. Traci Olguin at his next regular appointment. My thanks to Dr. Traci Olguin for the consultation.           VAHE Olvera    D: 08/07/2018 12:37:33       T: 08/07/2018 12:39:53     [email or web address]  Job#: [id number]     Doc#: [id number]    CC:  Issac Goodson

## 2018-08-09 LAB — SURGICAL PATHOLOGY REPORT: NORMAL

## 2018-08-14 ENCOUNTER — OFFICE VISIT (OUTPATIENT)
Dept: SURGERY | Age: 63
End: 2018-08-14
Payer: MEDICARE

## 2018-08-14 ENCOUNTER — CARE COORDINATION (OUTPATIENT)
Dept: CARE COORDINATION | Age: 63
End: 2018-08-14

## 2018-08-14 VITALS
RESPIRATION RATE: 18 BRPM | HEIGHT: 71 IN | HEART RATE: 64 BPM | DIASTOLIC BLOOD PRESSURE: 82 MMHG | WEIGHT: 196 LBS | BODY MASS INDEX: 27.44 KG/M2 | SYSTOLIC BLOOD PRESSURE: 114 MMHG

## 2018-08-14 DIAGNOSIS — K63.5 HYPERPLASTIC POLYP OF SIGMOID COLON: ICD-10-CM

## 2018-08-14 DIAGNOSIS — K57.30 DIVERTICULAR DISEASE OF COLON: ICD-10-CM

## 2018-08-14 DIAGNOSIS — K59.00 CONSTIPATION, UNSPECIFIED CONSTIPATION TYPE: ICD-10-CM

## 2018-08-14 DIAGNOSIS — Z98.890 S/P COLONOSCOPY WITH POLYPECTOMY: Primary | ICD-10-CM

## 2018-08-14 PROCEDURE — 3017F COLORECTAL CA SCREEN DOC REV: CPT | Performed by: SURGERY

## 2018-08-14 PROCEDURE — 1036F TOBACCO NON-USER: CPT | Performed by: SURGERY

## 2018-08-14 PROCEDURE — 99212 OFFICE O/P EST SF 10 MIN: CPT | Performed by: SURGERY

## 2018-08-14 PROCEDURE — G8417 CALC BMI ABV UP PARAM F/U: HCPCS | Performed by: SURGERY

## 2018-08-14 PROCEDURE — G8427 DOCREV CUR MEDS BY ELIG CLIN: HCPCS | Performed by: SURGERY

## 2018-08-14 NOTE — LETTER
[facility] Surgical Specialist - aCndido Wolfe  [facility] [address] [address]  Phone: [phone]  Fax: [phone]    Legrand Fleischer, MD        September 3, 2018     MD [phone]    Patient: Britt Carter  MR Number: [id number]  YOB: 1955  Date of Visit: 8/14/2018    Dear Dr. Kamla Ibarra: Thank you for the request for consultation for Rosi Boyd to me for the evaluation of history of colon polyps, constipation. Below are the relevant portions of my assessment and plan of care. Assessment:      Diagnosis Orders   1. S/P colonoscopy with polypectomy     2. Hyperplastic polyp of sigmoid colon     3. Diverticular disease of colon     4. Constipation, unspecified constipation type           Plan:     Colonoscopy findings and pathology reviewed with Mr Yrn Hope. Recommend next screening colonoscopy in 4-5 years. Discussed importance of a high fiber low fat diet with fiber supplementation, increased water intake, appropriate use of Milk of Mag. If you have questions, please do not hesitate to call me. I look forward to following Clarisse Grande along with you.     Sincerely,        Legrand Fleischer, MD

## 2018-08-14 NOTE — CARE COORDINATION
Ambulatory Care Coordination Note  8/14/2018  CM Risk Score: 6  Carlos Mortality Risk Score:      ACC: Radames Maynard RN    Patient Active Problem List    Diagnosis Date Noted    History of colon polyps 08/07/2018    Rhabdomyolysis 01/20/2018    Benign non-nodular prostatic hyperplasia with lower urinary tract symptoms 12/01/2016    Urinary retention 12/01/2016    Non-traumatic rhabdomyolysis 07/02/2015    Generalized weakness 07/02/2015    Scrotal irritation 01/14/2014    Blood in stool 01/07/2014    BPH (benign prostatic hyperplasia) 12/17/2013    Microscopic hematuria 12/17/2013    Yeast dermatitis of penis 12/17/2013    Hematuria 12/03/2013    Lower urinary tract symptoms (LUTS) 12/03/2013    Type 2 diabetes mellitus ([facility] [address] 06/28/2012    Essential hypertension, benign 06/28/2012 08/02/18 patient seen Dr. Akhil Sotomayor (urologist)-copy of office visit note:  TODAY  Here today to follow-up for BPH and urinary retention. He continues to take Flomax and Proscar both. Urine culture negative. Random bladder scan today is 160ml, he urinated >45 minutes ago. He denies constipation. He denies dysuria or gross hematuria. He has stopped drinking soda as instructed and has had significant improvement in frequency, urgency, and incontinence  Assessment:     Diagnosis Orders   1. Urinary retention      2. Benign non-nodular prostatic hyperplasia with lower urinary tract symptoms      3. Prostate cancer screening  Psa screening                       Plan:   He will continue to avoid soda. He will continue flomax and proscar daily. We will check a screening PSA now. He will follow-up in 6 months with a repeat PVR. Patient had appointment with Dr. Ezequiel Holliday 07/31/18 for colonoscopy consult; and then had his colonoscopy on 08/07/18.      IN reviewing patient's chart; patient is actually on the tangela Smith schedule today to take patient to monica Hernandez doctor's appointment and then to take patient to

## 2018-08-16 ENCOUNTER — HOSPITAL ENCOUNTER (OUTPATIENT)
Age: 63
Discharge: HOME | End: 2018-08-16
Payer: MEDICARE

## 2018-08-16 LAB
ABSOLUTE EOS #: 0.1 K/UL (ref 0–0.4)
ABSOLUTE IMMATURE GRANULOCYTE: ABNORMAL K/UL (ref 0–0.3)
ABSOLUTE LYMPH #: 1.3 K/UL (ref 1–4.8)
ABSOLUTE MONO #: 0.4 K/UL (ref 0–1)
ALBUMIN SERPL-MCNC: 4.3 G/DL (ref 3.5–5.2)
ALBUMIN/GLOBULIN RATIO: NORMAL (ref 1–2.5)
ALP BLD-CCNC: 44 U/L (ref 40–129)
ALT SERPL-CCNC: 17 U/L (ref 5–41)
AST SERPL-CCNC: 21 U/L
BASOPHILS # BLD: 1 % (ref 0–2)
BASOPHILS ABSOLUTE: 0 K/UL (ref 0–0.2)
BILIRUB SERPL-MCNC: 0.46 MG/DL (ref 0.3–1.2)
BILIRUBIN DIRECT: <0.08 MG/DL
BILIRUBIN, INDIRECT: NORMAL MG/DL (ref 0–1)
CHOLESTEROL/HDL RATIO: 2.7
CHOLESTEROL: 124 MG/DL
DIFFERENTIAL TYPE: YES
EOSINOPHILS RELATIVE PERCENT: 2 % (ref 0–5)
GLOBULIN: NORMAL G/DL (ref 1.5–3.8)
HCT VFR BLD CALC: 40.2 % (ref 41–53)
HDLC SERPL-MCNC: 46 MG/DL
HEMOGLOBIN: 13.5 G/DL (ref 13.5–17.5)
IMMATURE GRANULOCYTES: ABNORMAL %
LDL CHOLESTEROL: 62 MG/DL (ref 0–130)
LYMPHOCYTES # BLD: 26 % (ref 13–44)
MCH RBC QN AUTO: 31.4 PG (ref 26–34)
MCHC RBC AUTO-ENTMCNC: 33.6 G/DL (ref 31–37)
MCV RBC AUTO: 93.4 FL (ref 80–100)
MONOCYTES # BLD: 9 % (ref 5–9)
NRBC AUTOMATED: ABNORMAL PER 100 WBC
PATIENT FASTING?: YES
PDW BLD-RTO: 14.2 % (ref 12.1–15.2)
PLATELET # BLD: 146 K/UL (ref 140–450)
PLATELET ESTIMATE: ABNORMAL
PMV BLD AUTO: ABNORMAL FL (ref 6–12)
RBC # BLD: 4.31 M/UL (ref 4.5–5.9)
RBC # BLD: ABNORMAL 10*6/UL
SEG NEUTROPHILS: 62 % (ref 39–75)
SEGMENTED NEUTROPHILS ABSOLUTE COUNT: 3.1 K/UL (ref 2.1–6.5)
TOTAL PROTEIN: 7.1 G/DL (ref 6.4–8.3)
TRIGL SERPL-MCNC: 79 MG/DL
VALPROIC ACID LEVEL: 53 UG/ML (ref 50–125)
VALPROIC DATE LAST DOSE: NORMAL
VALPROIC DOSE AMOUNT: NORMAL
VALPROIC TIME LAST DOSE: NORMAL
VLDLC SERPL CALC-MCNC: NORMAL MG/DL (ref 1–30)
WBC # BLD: 5 K/UL (ref 3.5–11)
WBC # BLD: ABNORMAL 10*3/UL

## 2018-08-16 PROCEDURE — 80164 ASSAY DIPROPYLACETIC ACD TOT: CPT

## 2018-08-16 PROCEDURE — 85025 COMPLETE CBC W/AUTO DIFF WBC: CPT

## 2018-08-16 PROCEDURE — 80061 LIPID PANEL: CPT

## 2018-08-16 PROCEDURE — 36415 COLL VENOUS BLD VENIPUNCTURE: CPT

## 2018-08-16 PROCEDURE — 80076 HEPATIC FUNCTION PANEL: CPT

## 2018-08-16 NOTE — PROGRESS NOTES
Subjective:      Patient ID: Willard Romero is a 59 y.o. male. HPI     Mr Mercedes Canales returns for follow up after colonoscopy with polypectomies x2 Aug 7, 2018. POSTOPERATIVE DIAGNOSES:  1.  Small sessile hyperplastic sigmoid polyps x2.  2.  Extensive diverticulosis in right and left colon. He is doing well, without complaints. Review of Systems   Constitutional: Negative for activity change, appetite change, chills, fever and unexpected weight change. HENT: Negative for nosebleeds, sneezing, sore throat and trouble swallowing. Eyes: Negative for visual disturbance. Respiratory: Negative for cough, choking and shortness of breath. Cardiovascular: Negative for chest pain, palpitations and leg swelling. Gastrointestinal: Positive for constipation. Negative for abdominal pain, blood in stool, nausea and vomiting. Genitourinary: Negative for dysuria, flank pain and hematuria. Musculoskeletal: Negative for back pain, gait problem and myalgias. Allergic/Immunologic: Negative for immunocompromised state. Neurological: Negative for dizziness, seizures, syncope, weakness and headaches. Hematological: Does not bruise/bleed easily. Psychiatric/Behavioral: Negative for confusion and sleep disturbance.         Past Medical History:   Diagnosis Date    Chronic schizophrenic ([address]     Hypertension     Neuromuscular disorder ([facility] [address]     \"slight case of Parkinsons\"    Type II or unspecified type diabetes mellitus without mention of complication, not stated as uncontrolled        Past Surgical History:   Procedure Laterality Date    COLONOSCOPY  01/2014    Morteza HANSON    COLONOSCOPY  01/07/14    colon polyp    COLONOSCOPY N/A 8/7/2018    COLONOSCOPY POLYPECTOMY SNARE/COLD BIOPSY performed by Hiral Valle MD at [address]      right foot    HEMORRHOID SURGERY         Family History   Problem Relation Age of Onset    Diabetes Father     Diabetes Sister     Diabetes Brother reviewed with Mr Kaci Lundy. Recommend next screening colonoscopy in 4-5 years.   Discussed importance of a high fiber low fat diet with fiber supplementation, increased water intake, appropriate use of Milk of Kimberley Mazariegos MD

## 2018-08-20 ENCOUNTER — CARE COORDINATION (OUTPATIENT)
Dept: CARE COORDINATION | Age: 63
End: 2018-08-20

## 2018-08-20 NOTE — CARE COORDINATION
Ambulatory Care Coordination Note  8/20/2018  CM Risk Score: 6  Carlos Mortality Risk Score:      ACC: Ken Abdalla RN   Follow up phone call attempt to patient today. vm message left and requested phone call back. CC Plan: Will await call back from patient and if no return call, will attempt to reach patient again later this week. Patient Active Problem List    Diagnosis Date Noted    History of colon polyps 08/07/2018    Rhabdomyolysis 01/20/2018    Benign non-nodular prostatic hyperplasia with lower urinary tract symptoms 12/01/2016    Urinary retention 12/01/2016    Non-traumatic rhabdomyolysis 07/02/2015    Generalized weakness 07/02/2015    Scrotal irritation 01/14/2014    Blood in stool 01/07/2014    BPH (benign prostatic hyperplasia) 12/17/2013    Microscopic hematuria 12/17/2013    Yeast dermatitis of penis 12/17/2013    Hematuria 12/03/2013    Lower urinary tract symptoms (LUTS) 12/03/2013    Type 2 diabetes mellitus ([facility] [address] 06/28/2012    Essential hypertension, benign 06/28/2012 08/02/18 patient seen Dr. Jane Wright (urologist)-copy of office visit note:  TODAY  Here today to follow-up for BPH and urinary retention. He continues to take Flomax and Proscar both. Urine culture negative. Random bladder scan today is 160ml, he urinated >45 minutes ago. He denies constipation. He denies dysuria or gross hematuria. He has stopped drinking soda as instructed and has had significant improvement in frequency, urgency, and incontinence  Assessment:     Diagnosis Orders   1. Urinary retention      2. Benign non-nodular prostatic hyperplasia with lower urinary tract symptoms      3. Prostate cancer screening  Psa screening                       Plan:   He will continue to avoid soda. He will continue flomax and proscar daily. We will check a screening PSA now. He will follow-up in 6 months with a repeat PVR.      Patient had appointment with Dr. Sotero Moe 07/31/18 for colonoscopy

## 2018-08-22 ENCOUNTER — CARE COORDINATION (OUTPATIENT)
Dept: CARE COORDINATION | Age: 63
End: 2018-08-22

## 2018-08-22 NOTE — CARE COORDINATION
a repeat PVR. Patient had appointment with Dr. Arnol Medina 07/31/18 for colonoscopy consult; and then had his colonoscopy on 08/07/18. This nurse CC wanting to resume following patient in Care coordination. Review his care. Assess for further needs in the home. CC Plan: This nurse CC will plan to reach out to patient later this week to review. Lab Results   Component Value Date    LABA1C 5.7 01/20/2018     Lab Results   Component Value Date     01/20/2018           Care Coordination Interventions    Program Enrollment:  Complex Care  Referral from Primary Care Provider:  No  Suggested Interventions and [employer] Wheels:  Completed           Prior to Admission medications    Medication Sig Start Date End Date Taking? Authorizing Provider   gabapentin (NEURONTIN) 300 MG capsule Take 1 capsule by mouth daily for 180 days. . 7/5/18 1/1/19  Doretha Celestin MD   tamsulosin (FLOMAX) 0.4 MG capsule Take 1 capsule by mouth every evening 6/25/18   Duayne Course, APRN - CNP   finasteride (PROSCAR) 5 MG tablet take 1 tablet by mouth daily 6/25/18   Duayne Course, APRN - CNP   divalproex (DEPAKOTE ER) 500 MG extended release tablet Take 1,000 mg by mouth daily    Historical Provider, MD   primidone (MYSOLINE) 50 MG tablet Take 50 mg by mouth nightly Per Dr. Jan Lockhart; 1 tablet at night    Historical Provider, MD   glucose blood VI test strips (FREESTYLE LITE) strip Test sugar once daily and as needed 11/27/17   Anncharles Barron, DO   Lancets MISC Test blood sugar once daily and as needed( ONE TOUCH ULTRA DELICA) 5/02/28   Ambrosio Barron, DO   benztropine (COGENTIN) 1 MG tablet Take 0.5 mg by mouth 2 times daily Per Dr. Blanca Ken Provider, MD   risperiDONE (RISPERDAL) 2 MG tablet Take 2 mg by mouth nightly  10/12/15   Historical Provider, MD   clonazePAM (KLONOPIN) 1 MG tablet Take 1 tablet by mouth 2 times daily.  11/24/14   Dya Montiel DO   Lancet Device MISC Delico Lancet

## 2018-08-23 ENCOUNTER — CARE COORDINATION (OUTPATIENT)
Dept: CARE COORDINATION | Age: 63
End: 2018-08-23

## 2018-08-23 NOTE — CARE COORDINATION
Ambulatory Care Coordination Note  8/27/2018  CM Risk Score: 6  Carlos Mortality Risk Score:      ACC: Consuelo Thornton, RN    Summary Note:     Phone call back from patient. States, Yohan Lincoln has been doing leg exercises at home that therapy taught him to do. \"   Informs, Yohan Lincoln has not been adding any salt to his foods at home when he is cooking. \"   Patient is getting Meals on Wheels Monday through Friday, and he loves these. \"   Patient states, Esha Patel is a jaime from the hospital that brings him a meal every day from the hospital that he eats for supper. \"   States, Yohan Lincoln has plenty to eat, and has plenty of groceries. \"   Patient is, \"getting groceries from the [employer] bank in [facility], and also goes to the [employer]. \"   Informs, Yohan Lincoln makes his own casseroles, and has plenty of food in his apartment\". Patient, \"does get food stamps\". Patient informs, Dieter Paizs he goes to [employer] daily, but is usually only drinking water when he goes\". Patient lives behind [employer], and does go for the socialization. Patient reports, Yohan Lincoln is really really getting stronger every day\". Patient informs, Yohan Lincoln has vegetables and fruits, and feels he is getting plenty of variety in his diet. \"   Questioned patient if he is monitoring his blood sugar at home? Informs, \"it was running 120's mostly\". He is, \"not always monitoring daily, but will try to follow closer. \"  Patient reports, Yoahn Lincoln is trying to cut out the diet pop, and just really eat better\". States, Yohan Lincoln is seeing Dr. Kelsy Montalvo 08/28/18 for diabetic shoes. \" He will, \"call the [employer] to bring him that day. \" Phone number given to patient at [phone]    States, Yohan Lincoln has been doing his own dishes, laundry,a nd cleaning his own bathroom. \" Denies needing additional help in his apartment at this time\". In reviewing chart with medicaid approval. Questioned patient,and he informs, \"he had too much money in his shelter to qualify for medicaid/passport\".   Reviewed with patient his social security disability of $648/month, but patient states, \" He has $05429 in his annuity though\". Patient reports, \"in December he will be getting interest checks again, as he recently took out a lump sum to live on\". 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 \"   Patient states, Clark Vernon does not need any help currently at home, has plenty of food, and is able to afford all of his bills. \"     Patient sees Dr. Fatemeh De La O for Neurology. Patient seeing Dr. Napoleon Blandon for psychiatry. Patient has been compliant with keep appointments. Patient, \"feels confident that he has all of his medications, and is taking these consistently. \"   Patient states, \"his medications are only $2-$3/piece for each of his medications. \" Reports, \"his Depakote is the most expensive and this is affordable. \"     Also discussed with patient getting [employer]. Tylor Danielsie El[employer] is local company; patient requesting this nurse could call Norval Goldmann at [employer] for him and have her call him at [phone] to get life alert set up. Phone call to Norval Goldmann with [employer] and she will call Kelsi Ochoa to set up the life alert button. This nurse CC will follow up with patient in 1 week to make sure that Life alert was able to get set up for patient. CC Plan:   No new concerns to report. Will move patient to surveillance monitoring monthly after following up on Life alert installment. Patient's last admission was 01/2018. Denies any further falls since. Diabetes Assessment    Medic Alert ID:  No  Meal Planning:  Avoidance of concentrated sweets   How often do you test your blood sugar?:  No Testing   Do you have barriers with adherence to non-pharmacologic self-management interventions?  (Nutrition/Exercise/Self-Monitoring):  Yes   Have you ever had to go to the ED for symptoms of low blood sugar?:  No       No patient-reported symptoms   Do you have hyperglycemia symptoms?:  No   Do you have hypoglycemia symptoms?:  No   Blood Sugar Trends:  No Change       and   General Assessment    Do you have any symptoms that are causing concern?:  No           Patient Active Problem List    Diagnosis Date Noted    History of colon polyps 08/07/2018    Rhabdomyolysis 01/20/2018    Benign non-nodular prostatic hyperplasia with lower urinary tract symptoms 12/01/2016    Urinary retention 12/01/2016    Non-traumatic rhabdomyolysis 07/02/2015    Generalized weakness 07/02/2015    Scrotal irritation 01/14/2014    Blood in stool 01/07/2014    BPH (benign prostatic hyperplasia) 12/17/2013    Microscopic hematuria 12/17/2013    Yeast dermatitis of penis 12/17/2013    Hematuria 12/03/2013    Lower urinary tract symptoms (LUTS) 12/03/2013    Type 2 diabetes mellitus ([facility] [address] 06/28/2012    Essential hypertension, benign 06/28/2012     Lab Results   Component Value Date    LABA1C 5.7 01/20/2018     Lab Results   Component Value Date     01/20/2018             Care Coordination Interventions    Program Enrollment:  Complex Care  Referral from Primary Care Provider:  No  Suggested Interventions and [employer]:  Completed         Goals Addressed             Most Recent     COMPLETED: Conditions and Symptoms   On track (7/2/2018)             I will schedule office visits, as directed by my provider. I will keep my appointment or reschedule if I have to cancel. I will notify my provider of any barriers to my plan of care. I will notify my provider of any symptoms that indicate a worsening of my condition. Barriers: lack of support and overwhelmed by complexity of regimen  Plan for overcoming my barriers: patient to continue with therapy exercises, using his walker in the home, keeping throw rugs and fall hazards out of walk ways. Patient to make slow position changes.   Confidence: 7/10  Anticipated Goal Completion Date: 06/30/18         Reduce Falls    Improving (8/23/2018) (INDERAL) 40 MG tablet Take 40 mg by mouth 2 times daily.       Historical Provider, MD       Future Appointments  Date Time Provider Daniela Canales   8/28/2018 12:00  [address]  None   8/28/2018 12:45 PM uGnjan Palencia MD [facility]   9/4/2018 1:30 PM [facility]  None   10/17/2018 12:20  [address]  None   10/17/2018 12:30 PM MD [facility]   2/7/2019 2:00 PM [facility] Lindsay [facility]

## 2018-08-28 ENCOUNTER — OFFICE VISIT (OUTPATIENT)
Dept: FAMILY MEDICINE CLINIC | Age: 63
End: 2018-08-28
Payer: MEDICARE

## 2018-08-28 VITALS
BODY MASS INDEX: 27.48 KG/M2 | WEIGHT: 197 LBS | SYSTOLIC BLOOD PRESSURE: 92 MMHG | HEART RATE: 72 BPM | DIASTOLIC BLOOD PRESSURE: 60 MMHG | OXYGEN SATURATION: 98 %

## 2018-08-28 DIAGNOSIS — E11.9 TYPE 2 DIABETES MELLITUS WITHOUT COMPLICATION, WITHOUT LONG-TERM CURRENT USE OF INSULIN (HCC): Primary | ICD-10-CM

## 2018-08-28 PROCEDURE — 99213 OFFICE O/P EST LOW 20 MIN: CPT | Performed by: FAMILY MEDICINE

## 2018-08-28 PROCEDURE — 1036F TOBACCO NON-USER: CPT | Performed by: FAMILY MEDICINE

## 2018-08-28 PROCEDURE — 2022F DILAT RTA XM EVC RTNOPTHY: CPT | Performed by: FAMILY MEDICINE

## 2018-08-28 PROCEDURE — G8417 CALC BMI ABV UP PARAM F/U: HCPCS | Performed by: FAMILY MEDICINE

## 2018-08-28 PROCEDURE — G8427 DOCREV CUR MEDS BY ELIG CLIN: HCPCS | Performed by: FAMILY MEDICINE

## 2018-08-28 PROCEDURE — 3017F COLORECTAL CA SCREEN DOC REV: CPT | Performed by: FAMILY MEDICINE

## 2018-08-28 PROCEDURE — 3044F HG A1C LEVEL LT 7.0%: CPT | Performed by: FAMILY MEDICINE

## 2018-08-28 ASSESSMENT — ENCOUNTER SYMPTOMS
COUGH: 0
SHORTNESS OF BREATH: 0
EYE DISCHARGE: 0
DIARRHEA: 0
EYE REDNESS: 0
VOMITING: 0

## 2018-08-28 NOTE — PROGRESS NOTES
RBC 4.51 05/05/2012    HGB 13.5 08/16/2018    HCT 40.2 08/16/2018    MCV 93.4 08/16/2018    MCH 31.4 08/16/2018    MCHC 33.6 08/16/2018    RDW 14.2 08/16/2018     08/16/2018     05/05/2012    MPV NOT REPORTED 08/16/2018     Lab Results   Component Value Date    TSH 3.11 07/03/2015     Lab Results   Component Value Date    CHOL 124 08/16/2018    HDL 46 08/16/2018    PSA 0.18 08/02/2018    LABA1C 5.7 01/20/2018          Assessment/Plan:       1. Type 2 diabetes mellitus without complication, without long-term current use of insulin ([address]  F/U Nov appt. Do daily foot checks and yearly eye exams    Pt told to keep legs up more when sitting especially sitting at computer all day. Pt to eat less salt and drink 32-40ozs daily water. Pt told to wear support hose from time he gets up and take off when he goes to bed. Has f/u Oct appt to keep    No Follow-up on file.       Electronically signed by Lg Fitzgerald MD on 8/28/2018 at 1:10 PM

## 2018-09-03 ASSESSMENT — ENCOUNTER SYMPTOMS
CHOKING: 0
SORE THROAT: 0
COUGH: 0
BLOOD IN STOOL: 0
BACK PAIN: 0
CONSTIPATION: 1
TROUBLE SWALLOWING: 0
ABDOMINAL PAIN: 0
NAUSEA: 0
VOMITING: 0
SHORTNESS OF BREATH: 0

## 2018-09-04 NOTE — COMMUNICATION BODY
Assessment:      Diagnosis Orders   1. S/P colonoscopy with polypectomy     2. Hyperplastic polyp of sigmoid colon     3. Diverticular disease of colon     4. Constipation, unspecified constipation type           Plan:     Colonoscopy findings and pathology reviewed with Mr Marissa Logan. Recommend next screening colonoscopy in 4-5 years. Discussed importance of a high fiber low fat diet with fiber supplementation, increased water intake, appropriate use of Milk of Mag.

## 2018-09-04 NOTE — PATIENT INSTRUCTIONS
Patient Education   Patient Education        Constipation: Care Instructions  Your Care Instructions    Constipation means that you have a hard time passing stools (bowel movements). People pass stools from 3 times a day to once every 3 days. What is normal for you may be different. Constipation may occur with pain in the rectum and cramping. The pain may get worse when you try to pass stools. Sometimes there are small amounts of bright red blood on toilet paper or the surface of stools. This is because of enlarged veins near the rectum (hemorrhoids). A few changes in your diet and lifestyle may help you avoid ongoing constipation. Your doctor may also prescribe medicine to help loosen your stool. Some medicines can cause constipation. These include pain medicines and antidepressants. Tell your doctor about all the medicines you take. Your doctor may want to make a medicine change to ease your symptoms. Follow-up care is a key part of your treatment and safety. Be sure to make and go to all appointments, and call your doctor if you are having problems. It's also a good idea to know your test results and keep a list of the medicines you take. How can you care for yourself at home? · Drink plenty of fluids, enough so that your urine is light yellow or clear like water. If you have kidney, heart, or liver disease and have to limit fluids, talk with your doctor before you increase the amount of fluids you drink. · Include high-fiber foods in your diet each day. These include fruits, vegetables, beans, and whole grains. · Get at least 30 minutes of exercise on most days of the week. Walking is a good choice. You also may want to do other activities, such as running, swimming, cycling, or playing tennis or team sports. · Take a fiber supplement, such as Citrucel or Metamucil, every day. Read and follow all instructions on the label. · Schedule time each day for a bowel movement. A daily routine may help.  Take

## 2018-09-05 ENCOUNTER — TELEPHONE (OUTPATIENT)
Dept: FAMILY MEDICINE CLINIC | Age: 63
End: 2018-09-05

## 2018-09-05 NOTE — TELEPHONE ENCOUNTER
Tell pt that looks like he is doing better and keep wearing the compression hose. But pt does need to have his feet up if sitting at the computer or just sitting in general. Feet just need to be straight out on a stool not dangling down and not above his head.

## 2018-09-05 NOTE — TELEPHONE ENCOUNTER
Patient called in stating he is wearing his compressions    States this is helping his swelling so much     Asking if he needs to keep his feet propped up all day now or just some ? States he is Limiting his water, salt intake    States when he does eat at [employer] he avoids hamburgers, quarter pounders and cheeseburgers    He now eats mcchickens and fish sandwhiches and wipes his fries off      3-4 pancakes  Instead of sausage     Wanted me to make sure PCP is aware    Health Maintenance   Topic Date Due    Hepatitis C screen  1955    HIV screen  05/27/1971    DTaP/Tdap/Td vaccine (1 - Tdap) 05/27/1975    Shingles Vaccine (1 of 2 - 2 Dose Series) 08/09/2014    Diabetic microalbuminuria test  04/04/2018    Flu vaccine (1) 09/01/2018    A1C test (Diabetic or Prediabetic)  01/20/2019    Potassium monitoring  01/29/2019    Creatinine monitoring  01/29/2019    Diabetic foot exam  08/14/2019    Lipid screen  08/16/2019    Diabetic retinal exam  09/04/2019    Colon cancer screen colonoscopy  08/07/2028    Pneumococcal med risk  Completed             (applicable per patient's age: Cancer Screenings, Depression Screening, Fall Risk Screening, Immunizations)    Hemoglobin A1C (%)   Date Value   01/20/2018 5.7   10/05/2017 5.9   04/04/2017 6.4     Microalb/Crt.  Ratio (mcg/mg creat)   Date Value   03/09/2016 10     LDL Cholesterol (mg/dL)   Date Value   08/16/2018 62     AST (U/L)   Date Value   08/16/2018 21     ALT (U/L)   Date Value   08/16/2018 17     BUN (mg/dL)   Date Value   01/29/2018 15      (goal A1C is < 7)   (goal LDL is <100) need 30-50% reduction from baseline     BP Readings from Last 3 Encounters:   08/28/18 92/60   08/14/18 114/82   08/07/18 (!) 81/59    (goal /80)      All Future Testing planned in [facility]:  Lab Frequency Next Occurrence       Next Visit Date:  Future Appointments  Date Time Provider Daniela Canales   10/17/2018 12:20  [address]  None

## 2018-09-14 ENCOUNTER — CARE COORDINATION (OUTPATIENT)
Dept: CARE COORDINATION | Age: 63
End: 2018-09-14

## 2018-09-24 ENCOUNTER — CARE COORDINATION (OUTPATIENT)
Dept: CARE COORDINATION | Age: 63
End: 2018-09-24

## 2018-10-01 ENCOUNTER — CARE COORDINATION (OUTPATIENT)
Dept: CARE COORDINATION | Age: 63
End: 2018-10-01

## 2018-10-02 ENCOUNTER — CARE COORDINATION (OUTPATIENT)
Dept: CARE COORDINATION | Age: 63
End: 2018-10-02

## 2018-10-02 NOTE — CARE COORDINATION
States, David Mejia is having barely any swelling at all now\". Patient informs, David Mejia is really not putting salt on his food at all right now\". Patient informs, \"his appetite has been good though\". Reports, David Mejia is weighing himself first thing in the morning, and his weight is staying stable between 185-190 on his home scale\"  Patient reports, David Mejia has not been checking his blood sugars at home too often, but will get some readings prior to seeing Dr. Sameer Persaud 10/17/18\". Patient has [facility] set up for patient to bring patient to appointment on 10/17/18. Patient has no other new concerns. CC Plan:   Plan will be to follow up with patient following his appointment with Dr. Sameer Persaud. Will assess at that time for continued need for CC or move patient to surveillance monitoring, follow up on any new instructions. Follow up on patients life alert. Diabetes Assessment    Medic Alert ID:  No  Meal Planning:  Avoidance of concentrated sweets   How often do you test your blood sugar?:  No Testing   Do you have barriers with adherence to non-pharmacologic self-management interventions? (Nutrition/Exercise/Self-Monitoring):  Yes   Have you ever had to go to the ED for symptoms of low blood sugar?:  No       No patient-reported symptoms       and   General Assessment    Do you have any symptoms that are causing concern?:  No         Patient last seen Dr. Sameer Persaud 08/28/18:   Assessment/Plan:         1. Type 2 diabetes mellitus without complication, without long-term current use of insulin ([address]  F/U Nov appt. Do daily foot checks and yearly eye exams     Pt told to keep legs up more when sitting especially sitting at computer all day. Pt to eat less salt and drink 32-40ozs daily water. Pt told to wear support hose from time he gets up and take off when he goes to bed.      Has f/u Oct appt to keep      Patient gets $154/month in food stamps.    Patient also getting his meals on wheels, which takes care of his lunch every MD   risperiDONE (RISPERDAL) 2 MG tablet Take 2 mg by mouth nightly  10/12/15   Historical Provider, MD   clonazePAM (KLONOPIN) 1 MG tablet Take 1 tablet by mouth 2 times daily. 11/24/14   Justnio Gell, DO   Lancet Device MISC Delico Lancet Device. Use daily 3/17/14   Yaima Puller A Jump, DO   Blood Glucose Monitoring Suppl KELLY Please dispense One Touch Ultra meter 3/6/14   Yaima Puller A Jump, DO   propranolol (INDERAL) 40 MG tablet Take 40 mg by mouth 2 times daily.       Historical Provider, MD       Future Appointments  Date Time Provider Daniela Canales   10/17/2018 12:20  [address]  None   10/17/2018 12:30 PM Christiano Marcus MD Abdi Crome MED [facility]   2/7/2019 2:00 PM [facility] Roth [facility]

## 2018-10-17 ENCOUNTER — OFFICE VISIT (OUTPATIENT)
Dept: FAMILY MEDICINE CLINIC | Age: 63
End: 2018-10-17
Payer: MEDICARE

## 2018-10-17 VITALS
SYSTOLIC BLOOD PRESSURE: 104 MMHG | BODY MASS INDEX: 26.74 KG/M2 | DIASTOLIC BLOOD PRESSURE: 74 MMHG | OXYGEN SATURATION: 98 % | HEART RATE: 80 BPM | HEIGHT: 71 IN | WEIGHT: 191 LBS

## 2018-10-17 DIAGNOSIS — E11.9 TYPE 2 DIABETES MELLITUS WITHOUT COMPLICATION, WITHOUT LONG-TERM CURRENT USE OF INSULIN (HCC): Primary | ICD-10-CM

## 2018-10-17 DIAGNOSIS — I10 ESSENTIAL HYPERTENSION, BENIGN: ICD-10-CM

## 2018-10-17 DIAGNOSIS — N40.0 BENIGN PROSTATIC HYPERPLASIA WITHOUT LOWER URINARY TRACT SYMPTOMS: ICD-10-CM

## 2018-10-17 LAB
CREATININE URINE POCT: NORMAL
HBA1C MFR BLD: 6 %
MICROALBUMIN/CREAT 24H UR: NORMAL MG/G{CREAT}
MICROALBUMIN/CREAT UR-RTO: NORMAL

## 2018-10-17 PROCEDURE — 1036F TOBACCO NON-USER: CPT | Performed by: FAMILY MEDICINE

## 2018-10-17 PROCEDURE — 83036 HEMOGLOBIN GLYCOSYLATED A1C: CPT | Performed by: FAMILY MEDICINE

## 2018-10-17 PROCEDURE — G8510 SCR DEP NEG, NO PLAN REQD: HCPCS | Performed by: FAMILY MEDICINE

## 2018-10-17 PROCEDURE — 82044 UR ALBUMIN SEMIQUANTITATIVE: CPT | Performed by: FAMILY MEDICINE

## 2018-10-17 PROCEDURE — 99214 OFFICE O/P EST MOD 30 MIN: CPT | Performed by: FAMILY MEDICINE

## 2018-10-17 PROCEDURE — 3044F HG A1C LEVEL LT 7.0%: CPT | Performed by: FAMILY MEDICINE

## 2018-10-17 PROCEDURE — 3017F COLORECTAL CA SCREEN DOC REV: CPT | Performed by: FAMILY MEDICINE

## 2018-10-17 PROCEDURE — 2022F DILAT RTA XM EVC RTNOPTHY: CPT | Performed by: FAMILY MEDICINE

## 2018-10-17 PROCEDURE — G8427 DOCREV CUR MEDS BY ELIG CLIN: HCPCS | Performed by: FAMILY MEDICINE

## 2018-10-17 PROCEDURE — G8484 FLU IMMUNIZE NO ADMIN: HCPCS | Performed by: FAMILY MEDICINE

## 2018-10-17 PROCEDURE — G8417 CALC BMI ABV UP PARAM F/U: HCPCS | Performed by: FAMILY MEDICINE

## 2018-10-17 ASSESSMENT — ENCOUNTER SYMPTOMS
BLOOD IN STOOL: 0
SHORTNESS OF BREATH: 0
CONSTIPATION: 0
EYE DISCHARGE: 0
TROUBLE SWALLOWING: 0
EYE REDNESS: 0
BLURRED VISION: 0
COUGH: 0
VISUAL CHANGE: 0
ABDOMINAL PAIN: 0
VOMITING: 0
FACIAL SWELLING: 0
NAUSEA: 0
DIARRHEA: 0

## 2018-10-17 ASSESSMENT — PATIENT HEALTH QUESTIONNAIRE - PHQ9
1. LITTLE INTEREST OR PLEASURE IN DOING THINGS: 0
SUM OF ALL RESPONSES TO PHQ QUESTIONS 1-9: 0
2. FEELING DOWN, DEPRESSED OR HOPELESS: 0
SUM OF ALL RESPONSES TO PHQ QUESTIONS 1-9: 0
SUM OF ALL RESPONSES TO PHQ9 QUESTIONS 1 & 2: 0

## 2018-10-17 NOTE — PATIENT INSTRUCTIONS
Marlin Borges SURVEY:    You may be receiving a survey from [employer] regarding your visit today. Please complete the survey to enable us to provide the highest quality of care to you and your family. If you cannot score us a very good on any question, please call the office to discuss how we could of made your experience a very good one. Thank you.

## 2018-10-17 NOTE — PROGRESS NOTES
Eyes: Pupils are equal, round, and reactive to light. Conjunctivae are normal. Right eye exhibits no discharge. Left eye exhibits no discharge. Neck: Neck supple. No thyromegaly present. Cardiovascular: Normal rate and regular rhythm. No murmur heard. Legs wrapped bilateral and no calf pain. Trace swelling bilateral.    Pulmonary/Chest: Effort normal and breath sounds normal.   Abdominal: Soft. Bowel sounds are normal. He exhibits no distension. There is no tenderness. Musculoskeletal: He exhibits no edema. Lymphadenopathy:     He has no cervical adenopathy. Neurological: He is alert and oriented to person, place, and time. Skin: Skin is warm and dry. No rash noted. Psychiatric: He has a normal mood and affect. His behavior is normal.   Vitals reviewed. Vitals:  /74 (Site: Left Upper Arm, Position: Sitting, Cuff Size: Medium Adult)   Pulse 80   Ht 5' 11\" (1.803 m)   Wt 191 lb (86.6 kg)   SpO2 98%   BMI 26.64 kg/m²       Data:     Lab Results   Component Value Date     01/29/2018    K 3.7 01/29/2018     01/29/2018    CO2 24 01/29/2018    BUN 15 01/29/2018    CREATININE 0.73 01/29/2018    GLUCOSE 110 01/29/2018    GLUCOSE 105 09/09/2011    PROT 7.1 08/16/2018    LABALBU 4.3 08/16/2018    LABALBU 4.0 05/25/2012    BILITOT 0.46 08/16/2018    ALKPHOS 44 08/16/2018    AST 21 08/16/2018    ALT 17 08/16/2018     Lab Results   Component Value Date    WBC 5.0 08/16/2018    RBC 4.31 08/16/2018    RBC 4.51 05/05/2012    HGB 13.5 08/16/2018    HCT 40.2 08/16/2018    MCV 93.4 08/16/2018    MCH 31.4 08/16/2018    MCHC 33.6 08/16/2018    RDW 14.2 08/16/2018     08/16/2018     05/05/2012    MPV NOT REPORTED 08/16/2018     Lab Results   Component Value Date    TSH 3.11 07/03/2015     Lab Results   Component Value Date    CHOL 124 08/16/2018    HDL 46 08/16/2018    PSA 0.18 08/02/2018    LABA1C 5.7 01/20/2018          Assessment/Plan:       1.  Type 2 diabetes mellitus

## 2018-11-01 ENCOUNTER — CARE COORDINATION (OUTPATIENT)
Dept: CARE COORDINATION | Age: 63
End: 2018-11-01

## 2018-11-01 NOTE — CARE COORDINATION
2:00 PM [facility] VAN UNIT [facility]  None   2/7/2019 2:00 PM Madi Avery MD Will Urol [facility]   4/17/2019 12:30 PM Jesu Albert MD WIL MED [facility]   4/17/2019 12:30 PM [facility]  None

## 2018-11-02 ENCOUNTER — CARE COORDINATION (OUTPATIENT)
Dept: CARE COORDINATION | Age: 63
End: 2018-11-02

## 2018-11-02 NOTE — CARE COORDINATION
any difficulties with urinating or having bowel movements. Patient following with urology in February. Last hospital stays in January for falls. No further falls since this time. Patient independent in his apartment. Denies any problems with showering, informs, Larry Ken has his shower chair, and feels steady getting in/out of the shower\". Discussed with patient staying away from anyone with flu-like symptoms/respiratory infections. Patient to stay in and away from the public as much as possible right now. Discussed frequent hand washing. Did discuss the flu shot with patient; patient reports he is going to get his flu shot at the local pharmacy, will notify MD office when he does. Patient aware of when to notify MD office with any issues, questions/concerns. Patient to reach out to care coordinator at [phone] with questions. Patient is aware of upcoming scheduled appointments. Reminded patient of walk in care clinic availability/hours; and when to utilize the facility if MD office not available. CC Plan: Will forward to [facility] manager to review. Patient ready for care coordination. Patient's Complex Care with CM Risk Score of 6, but feel he is ready for discharge. Patient reports, Larry Ken really does not feel he needs a life alert at this time; has not had recent falls. Diabetes Assessment    Medic Alert ID:  No  Meal Planning:  Avoidance of concentrated sweets   How often do you test your blood sugar?:  No Testing   Do you have barriers with adherence to non-pharmacologic self-management interventions?  (Nutrition/Exercise/Self-Monitoring):  Yes   Have you ever had to go to the ED for symptoms of low blood sugar?:  No       No patient-reported symptoms       and   General Assessment    Do you have any symptoms that are causing concern?:  No             Care Coordination Interventions    Program Enrollment:  Complex Care  Referral from Primary Care Provider:  No  Suggested Interventions and

## 2018-11-02 NOTE — PROGRESS NOTES
Last refilled on 10/30/17 for # 30 with 11 refills  Last lipid 4/6/18  Last seen on 7/27/18  No future appointments. Thank you. [facility]  Occupational Therapy  Evaluation  Date: 2018  Patient Name: Elkin Vazquez        MRN: [id number]    : 1955  (65 y.o.)  Gender: male   Referring Practitioner: Dr. Christal Garnica      Additional Pertinent Hx: Patient admitted due to a fall at home and dehydration.    Past Medical History:   Diagnosis Date    Chronic schizophrenic [facility]     Hyperlipidemia     Hypertension     Neuromuscular disorder ([facility]     Type II or unspecified type diabetes mellitus without mention of complication, not stated as uncontrolled      Past Surgical History:   Procedure Laterality Date    COLONOSCOPY      COLONOSCOPY  14    colon polyp    FOOT SURGERY      right foot    HEMORRHOID SURGERY                  Subjective  Subjective: Patient seated in bedside chair upon arrival.   Pain Level: 0  Orientation  Overall Orientation Status: Within Normal Limits  Vision  Vision: Impaired  Vision Exceptions: Wears glasses at all times  Hearing  Hearing: Within functional limits  Social/Functional History  Lives With: Alone  Type of Home: Apartment  Home Layout: One level (lives on 2nd floor of apartment)  Home Access: Elevator  Bathroom Shower/Tub: Tub/Shower unit  Bathroom Toilet: Standard  Bathroom Equipment: Grab bars in [address]: Quad cane  ADL Assistance: Independent  Homemaking Assistance: Independent  Homemaking Responsibilities: Yes  Meal Prep Responsibility: Primary  Laundry Responsibility: Primary  Cleaning Responsibility: Primary  Bill Paying/Finance Responsibility: Primary  Shopping Responsibility: Primary  Dependent Care Responsibility: No  Ambulation Assistance: Independent  Transfer Assistance: Independent  Active : No  Occupation: On disability  Prior Function  Lives With: Alone  ADL Assistance: Independent  Homemaking Assistance: Independent  Ambulation Assistance: Independent  Transfer Assistance: Independent    Objective          Perception  Overall Perceptual Status:

## 2018-11-15 ENCOUNTER — HOSPITAL ENCOUNTER (EMERGENCY)
Age: 63
Discharge: HOME | End: 2018-11-15
Attending: EMERGENCY MEDICINE
Payer: MEDICARE

## 2018-11-15 VITALS
RESPIRATION RATE: 14 BRPM | WEIGHT: 195.33 LBS | DIASTOLIC BLOOD PRESSURE: 48 MMHG | HEART RATE: 59 BPM | OXYGEN SATURATION: 100 % | TEMPERATURE: 97.4 F | BODY MASS INDEX: 27.24 KG/M2 | SYSTOLIC BLOOD PRESSURE: 81 MMHG

## 2018-11-15 DIAGNOSIS — R60.0 LEG EDEMA: Primary | ICD-10-CM

## 2018-11-15 DIAGNOSIS — Z86.69 HISTORY OF PARKINSON'S DISEASE: ICD-10-CM

## 2018-11-15 LAB
ABSOLUTE EOS #: 0.1 K/UL (ref 0–0.4)
ABSOLUTE IMMATURE GRANULOCYTE: ABNORMAL K/UL (ref 0–0.3)
ABSOLUTE LYMPH #: 1.3 K/UL (ref 1–4.8)
ABSOLUTE MONO #: 0.4 K/UL (ref 0–1)
ANION GAP SERPL CALCULATED.3IONS-SCNC: 10 MMOL/L (ref 9–17)
BASOPHILS # BLD: 1 % (ref 0–2)
BASOPHILS ABSOLUTE: 0 K/UL (ref 0–0.2)
BNP INTERPRETATION: ABNORMAL
BUN BLDV-MCNC: 30 MG/DL (ref 8–23)
BUN/CREAT BLD: 35 (ref 9–20)
CALCIUM SERPL-MCNC: 9.2 MG/DL (ref 8.6–10.4)
CHLORIDE BLD-SCNC: 102 MMOL/L (ref 98–107)
CO2: 29 MMOL/L (ref 20–31)
CREAT SERPL-MCNC: 0.85 MG/DL (ref 0.7–1.2)
DIFFERENTIAL TYPE: YES
EOSINOPHILS RELATIVE PERCENT: 1 % (ref 0–5)
GFR AFRICAN AMERICAN: >60 ML/MIN
GFR NON-AFRICAN AMERICAN: >60 ML/MIN
GFR SERPL CREATININE-BSD FRML MDRD: ABNORMAL ML/MIN/{1.73_M2}
GFR SERPL CREATININE-BSD FRML MDRD: ABNORMAL ML/MIN/{1.73_M2}
GLUCOSE BLD-MCNC: 101 MG/DL (ref 70–99)
HCT VFR BLD CALC: 39.5 % (ref 41–53)
HEMOGLOBIN: 12.9 G/DL (ref 13.5–17.5)
IMMATURE GRANULOCYTES: ABNORMAL %
LYMPHOCYTES # BLD: 28 % (ref 13–44)
MCH RBC QN AUTO: 30.8 PG (ref 26–34)
MCHC RBC AUTO-ENTMCNC: 32.7 G/DL (ref 31–37)
MCV RBC AUTO: 94.3 FL (ref 80–100)
MONOCYTES # BLD: 9 % (ref 5–9)
NRBC AUTOMATED: ABNORMAL PER 100 WBC
PDW BLD-RTO: 14.8 % (ref 12.1–15.2)
PLATELET # BLD: 131 K/UL (ref 140–450)
PLATELET ESTIMATE: ABNORMAL
PMV BLD AUTO: ABNORMAL FL (ref 6–12)
POTASSIUM SERPL-SCNC: 3.9 MMOL/L (ref 3.7–5.3)
PRO-BNP: 996 PG/ML
RBC # BLD: 4.19 M/UL (ref 4.5–5.9)
RBC # BLD: ABNORMAL 10*6/UL
SEG NEUTROPHILS: 61 % (ref 39–75)
SEGMENTED NEUTROPHILS ABSOLUTE COUNT: 2.9 K/UL (ref 2.1–6.5)
SODIUM BLD-SCNC: 141 MMOL/L (ref 135–144)
TROPONIN INTERP: NORMAL
TROPONIN T: <0.03 NG/ML
WBC # BLD: 4.8 K/UL (ref 3.5–11)
WBC # BLD: ABNORMAL 10*3/UL

## 2018-11-15 PROCEDURE — 36415 COLL VENOUS BLD VENIPUNCTURE: CPT

## 2018-11-15 PROCEDURE — 83880 ASSAY OF NATRIURETIC PEPTIDE: CPT

## 2018-11-15 PROCEDURE — 85025 COMPLETE CBC W/AUTO DIFF WBC: CPT

## 2018-11-15 PROCEDURE — 80048 BASIC METABOLIC PNL TOTAL CA: CPT

## 2018-11-15 PROCEDURE — 99283 EMERGENCY DEPT VISIT LOW MDM: CPT

## 2018-11-15 PROCEDURE — 84484 ASSAY OF TROPONIN QUANT: CPT

## 2018-11-15 NOTE — ED NOTES
Arrives by EMS complaint of swollen feet. Wears support stockings, has positive pulses distal. Blood sugar reported by EMS was 96.       Timi Wilson RN  11/15/18 [id number]

## 2018-11-15 NOTE — ED PROVIDER NOTES
findings. Follow-up with primary care provider next week. Return to ED if worse. ED Medications administered this visit:  Medications - No data to display    New Prescriptions from this visit:    New Prescriptions    No medications on file       Follow-up:  No follow-up provider specified. Final Impression:   1. Leg edema    2.  History of Parkinson's disease               (Please note that portions of this note were completed with a voice recognition program.  Efforts were made to edit the dictations but occasionally words are mis-transcribed.)        Meagan Vasquez MD  11/15/18 8382

## 2018-11-16 ENCOUNTER — TELEPHONE (OUTPATIENT)
Dept: FAMILY MEDICINE CLINIC | Age: 63
End: 2018-11-16

## 2018-11-16 NOTE — TELEPHONE ENCOUNTER
2nd attempt to call patient  [facility] ([facility] ED Follow up Call                [address] IF NOT USED  Hi, this message is for Ann Casiano. This is Tally Linker from [facility] office. Just calling to see how you are doing after your recent visit to the Emergency Room. [facility] wants to make sure you were able to fill any prescriptions and that you understand your discharge instructions. Please return our call if you need to make a follow up appointment with your provider or have any further needs. Our phone number is [phone]. Have a great day.

## 2018-11-19 ENCOUNTER — HOSPITAL ENCOUNTER (EMERGENCY)
Age: 63
Discharge: HOME | End: 2018-11-19
Attending: FAMILY MEDICINE
Payer: MEDICARE

## 2018-11-19 ENCOUNTER — CARE COORDINATION (OUTPATIENT)
Dept: CARE COORDINATION | Age: 63
End: 2018-11-19

## 2018-11-19 VITALS
OXYGEN SATURATION: 99 % | SYSTOLIC BLOOD PRESSURE: 104 MMHG | BODY MASS INDEX: 26.5 KG/M2 | HEART RATE: 66 BPM | DIASTOLIC BLOOD PRESSURE: 69 MMHG | RESPIRATION RATE: 20 BRPM | WEIGHT: 190 LBS | TEMPERATURE: 97.5 F

## 2018-11-19 DIAGNOSIS — R60.0 BILATERAL LOWER EXTREMITY EDEMA: Primary | ICD-10-CM

## 2018-11-19 PROCEDURE — 99284 EMERGENCY DEPT VISIT MOD MDM: CPT

## 2018-11-19 RX ORDER — FUROSEMIDE 20 MG/1
20 TABLET ORAL DAILY
Qty: 5 TABLET | Refills: 1 | Status: SHIPPED | OUTPATIENT
Start: 2018-11-19 | End: 2018-11-28

## 2018-11-19 NOTE — CARE COORDINATION
PHone call from [facility], [place], reporting second ED visit in last 2 weeks. Requesting care coordination follow up. CC Plan:    This nurse CC will attempt reach out to patient tomorrow

## 2018-11-20 ENCOUNTER — CARE COORDINATION (OUTPATIENT)
Dept: CARE COORDINATION | Age: 63
End: 2018-11-20

## 2018-11-20 NOTE — TELEPHONE ENCOUNTER
Went to ER
Patient Active Problem List:     Type 2 diabetes mellitus [facility]     Essential hypertension, benign     Lower urinary tract symptoms (LUTS)     BPH (benign prostatic hyperplasia)     Microscopic hematuria     Yeast dermatitis of penis     Scrotal irritation     Non-traumatic rhabdomyolysis     Generalized weakness     Benign non-nodular prostatic hyperplasia with lower urinary tract symptoms     Urinary retention     Rhabdomyolysis     History of colon polyps

## 2018-11-20 NOTE — CARE COORDINATION
stockings? Informs, \"that he has ace wraps on both legs currently from the ER\". Patient feels, \"that he can get them off/on hiself\". Phone call to Dr. Hilario Adames office to see if patient missed his last appointment? Patient informs, 'that he has not seen Dr. Lona Muñiz in 6 months. Last seen in may. Patient is due to be seen now as he was instructed to call and schedule a follow up in 6 months. Per Sonali Shaikh will call patient today and schedule\". Patient reports, Demond Muñiz is having regular bowel movements\". Takes fiber if he needs it. Patient denies any issues with urinating. Patient denies wanting home health care in the home. Does feel, \"like he is getting stronger, does not feel like his psychiatric medications are causing any of this; although did admit to it taking him a long time to get up due to feeling so relaxed on the phone\". Phone call to dr. Samantha Bella office to follow up on patient's care;  Spoke to [facility], notified of patient admitting that it took him 2 hours to get out of his chair because he was, 'so relaxed that he just couldn't get up\" Requesting Abigail Hidalgo to please notify dr. Ban Jennings of concern with patient's psychiatric medications, and request for dr. Ban Jennings to please evaluate. Also notified patient that he is currently scheduled for next Wednesday with Dr. Alfreda Portillo 11/28/18 at 915am. Reviewed [facility] phone number and patient will, 'call and schedule himself a [facility] ride\". Patient reports, Demond Muñiz has plenty to eat, can get his own food prepared, and do his own cleaning and laundry\". This nurse CC will continue to evaluate for safety concerns. CC Plan: Will follow up with patient tomorrow. Dr. Hilario Adames office to call patient and schedule follow up. Requested Dr. Samantha Bella nurse to call and evaluate patient for medications.    Patient scheduled for follow up with dr. Alfread Portillo next week on Wednesday 11/28    Will refer patient to [facility] dietician to call and follow up

## 2018-11-21 ENCOUNTER — CARE COORDINATION (OUTPATIENT)
Dept: CARE COORDINATION | Age: 63
End: 2018-11-21

## 2018-11-21 NOTE — CARE COORDINATION
every evening 6/25/18   LYNETTE Muñiz CNP   finasteride (PROSCAR) 5 MG tablet take 1 tablet by mouth daily 6/25/18   LYNETTE Muñiz CNP   divalproex (DEPAKOTE ER) 500 MG extended release tablet Take 1,000 mg by mouth daily    Historical Provider, MD   primidone (MYSOLINE) 50 MG tablet Take 50 mg by mouth nightly Per Dr. Grady Palafox; 1 tablet at night    Historical Provider, MD   glucose blood VI test strips (FREESTYLE LITE) strip Test sugar once daily and as needed 11/27/17   Tori Barron, DO   Lancets MISC Test blood sugar once daily and as needed( ONE TOUCH ULTRA DELICA) 1/26/17   Ambrosio Barron, DO   benztropine (COGENTIN) 1 MG tablet Take 0.5 mg by mouth 2 times daily Per Dr. David Brunson Provider, MD   risperiDONE (RISPERDAL) 2 MG tablet Take 2 mg by mouth nightly  10/12/15   Historical Provider, MD   clonazePAM (KLONOPIN) 1 MG tablet Take 1 tablet by mouth 2 times daily. 11/24/14   Zoe Mcneill,    Lancet Device MISC Delico Lancet Device. Use daily 3/17/14   Tori Barron, DO   Blood Glucose Monitoring Suppl KELLY Please dispense One Touch Ultra meter 3/6/14   Tori Barron, DO   propranolol (INDERAL) 40 MG tablet Take 40 mg by mouth 2 times daily.       Historical Provider, MD       Future Appointments  Date Time Provider Daniela Canales   11/28/2018 8:40  [address]  None   11/28/2018 9:15 AM MD WIL Tavares MED [facility]   11/30/2018 9:30 AM [facility] UN[facility]  None   2/7/2019 2:00 PM [facility]BI[facility]AN UNIT [facility]  None   2/7/2019 2:00 PM MD Eliu Liebermanl [facility]   4/17/2019 12:30 PM MD Lee Tavares MED [facility]   4/17/2019 12:30 [facility]  None

## 2018-11-22 ENCOUNTER — HOSPITAL ENCOUNTER (OUTPATIENT)
Age: 63
Setting detail: OBSERVATION
Discharge: HOME HEALTH | End: 2018-11-24
Attending: EMERGENCY MEDICINE | Admitting: INTERNAL MEDICINE
Payer: MEDICARE

## 2018-11-22 DIAGNOSIS — R53.1 GENERAL WEAKNESS: ICD-10-CM

## 2018-11-22 DIAGNOSIS — G20 PARKINSONIAN TREMOR (HCC): ICD-10-CM
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
## 2018-11-22 DIAGNOSIS — R53.1 GENERALIZED WEAKNESS: ICD-10-CM

## 2018-11-22 PROBLEM — M62.82 RHABDOMYOLYSIS: Status: ACTIVE | Noted: 2018-11-22

## 2018-11-22 LAB
-: NORMAL
ABSOLUTE EOS #: 0 K/UL (ref 0–0.4)
ABSOLUTE IMMATURE GRANULOCYTE: ABNORMAL K/UL (ref 0–0.3)
ABSOLUTE LYMPH #: 0.6 K/UL (ref 1–4.8)
ABSOLUTE MONO #: 0.4 K/UL (ref 0–1)
ALBUMIN SERPL-MCNC: 4.3 G/DL (ref 3.5–5.2)
ALBUMIN/GLOBULIN RATIO: ABNORMAL (ref 1–2.5)
ALP BLD-CCNC: 49 U/L (ref 40–129)
ALT SERPL-CCNC: 12 U/L (ref 5–41)
AMORPHOUS: NORMAL
ANION GAP SERPL CALCULATED.3IONS-SCNC: 11 MMOL/L (ref 9–17)
AST SERPL-CCNC: 17 U/L
BACTERIA: NORMAL
BASOPHILS # BLD: 0 % (ref 0–2)
BASOPHILS ABSOLUTE: 0 K/UL (ref 0–0.2)
BILIRUB SERPL-MCNC: 0.56 MG/DL (ref 0.3–1.2)
BILIRUBIN URINE: NEGATIVE
BUN BLDV-MCNC: 27 MG/DL (ref 8–23)
BUN/CREAT BLD: 33 (ref 9–20)
CALCIUM SERPL-MCNC: 9.3 MG/DL (ref 8.6–10.4)
CASTS UA: NORMAL /LPF
CHLORIDE BLD-SCNC: 103 MMOL/L (ref 98–107)
CO2: 28 MMOL/L (ref 20–31)
COLOR: YELLOW
COMMENT UA: ABNORMAL
CREAT SERPL-MCNC: 0.82 MG/DL (ref 0.7–1.2)
CRYSTALS, UA: NORMAL /HPF
DIFFERENTIAL TYPE: YES
EOSINOPHILS RELATIVE PERCENT: 0 % (ref 0–5)
EPITHELIAL CELLS UA: NORMAL /HPF
GFR AFRICAN AMERICAN: >60 ML/MIN
GFR NON-AFRICAN AMERICAN: >60 ML/MIN
GFR SERPL CREATININE-BSD FRML MDRD: ABNORMAL ML/MIN/{1.73_M2}
GFR SERPL CREATININE-BSD FRML MDRD: ABNORMAL ML/MIN/{1.73_M2}
GLUCOSE BLD-MCNC: 129 MG/DL (ref 70–99)
GLUCOSE URINE: NEGATIVE
HCT VFR BLD CALC: 40.8 % (ref 41–53)
HEMOGLOBIN: 13.5 G/DL (ref 13.5–17.5)
IMMATURE GRANULOCYTES: ABNORMAL %
KETONES, URINE: ABNORMAL
LEUKOCYTE ESTERASE, URINE: NEGATIVE
LYMPHOCYTES # BLD: 8 % (ref 13–44)
MCH RBC QN AUTO: 31 PG (ref 26–34)
MCHC RBC AUTO-ENTMCNC: 33 G/DL (ref 31–37)
MCV RBC AUTO: 93.8 FL (ref 80–100)
MONOCYTES # BLD: 5 % (ref 5–9)
MUCUS: NORMAL
MYOGLOBIN: 470 NG/ML (ref 28–72)
NITRITE, URINE: NEGATIVE
NRBC AUTOMATED: ABNORMAL PER 100 WBC
OTHER OBSERVATIONS UA: NORMAL
PDW BLD-RTO: 14.4 % (ref 12.1–15.2)
PH UA: 6 (ref 5–8)
PLATELET # BLD: 107 K/UL (ref 140–450)
PLATELET ESTIMATE: ABNORMAL
PMV BLD AUTO: ABNORMAL FL (ref 6–12)
POTASSIUM SERPL-SCNC: 3.7 MMOL/L (ref 3.7–5.3)
PROTEIN UA: ABNORMAL
RBC # BLD: 4.36 M/UL (ref 4.5–5.9)
RBC # BLD: ABNORMAL 10*6/UL
RBC UA: NORMAL /HPF (ref 0–2)
RENAL EPITHELIAL, UA: NORMAL /HPF
SEG NEUTROPHILS: 87 % (ref 39–75)
SEGMENTED NEUTROPHILS ABSOLUTE COUNT: 7.2 K/UL (ref 2.1–6.5)
SODIUM BLD-SCNC: 142 MMOL/L (ref 135–144)
SPECIFIC GRAVITY UA: 1.02 (ref 1–1.03)
TOTAL PROTEIN: 7.2 G/DL (ref 6.4–8.3)
TRICHOMONAS: NORMAL
TURBIDITY: CLEAR
URINE HGB: NEGATIVE
UROBILINOGEN, URINE: ABNORMAL
WBC # BLD: 8.3 K/UL (ref 3.5–11)
WBC # BLD: ABNORMAL 10*3/UL
WBC UA: NORMAL /HPF
YEAST: NORMAL

## 2018-11-22 PROCEDURE — 6360000002 HC RX W HCPCS: Performed by: INTERNAL MEDICINE

## 2018-11-22 PROCEDURE — 99284 EMERGENCY DEPT VISIT MOD MDM: CPT

## 2018-11-22 PROCEDURE — G0378 HOSPITAL OBSERVATION PER HR: HCPCS

## 2018-11-22 PROCEDURE — 96372 THER/PROPH/DIAG INJ SC/IM: CPT

## 2018-11-22 PROCEDURE — 81001 URINALYSIS AUTO W/SCOPE: CPT

## 2018-11-22 PROCEDURE — 85025 COMPLETE CBC W/AUTO DIFF WBC: CPT

## 2018-11-22 PROCEDURE — 83874 ASSAY OF MYOGLOBIN: CPT

## 2018-11-22 PROCEDURE — 36415 COLL VENOUS BLD VENIPUNCTURE: CPT

## 2018-11-22 PROCEDURE — 80053 COMPREHEN METABOLIC PANEL: CPT

## 2018-11-22 PROCEDURE — 2580000003 HC RX 258: Performed by: EMERGENCY MEDICINE

## 2018-11-22 PROCEDURE — 2580000003 HC RX 258: Performed by: INTERNAL MEDICINE

## 2018-11-22 PROCEDURE — 94761 N-INVAS EAR/PLS OXIMETRY MLT: CPT

## 2018-11-22 PROCEDURE — 6370000000 HC RX 637 (ALT 250 FOR IP): Performed by: INTERNAL MEDICINE

## 2018-11-22 RX ORDER — FINASTERIDE 5 MG/1
5 TABLET, FILM COATED ORAL DAILY
Status: DISCONTINUED | OUTPATIENT
Start: 2018-11-23 | End: 2018-11-24 | Stop reason: HOSPADM

## 2018-11-22 RX ORDER — SODIUM CHLORIDE 0.9 % (FLUSH) 0.9 %
10 SYRINGE (ML) INJECTION PRN
Status: DISCONTINUED | OUTPATIENT
Start: 2018-11-22 | End: 2018-11-24 | Stop reason: HOSPADM

## 2018-11-22 RX ORDER — 0.9 % SODIUM CHLORIDE 0.9 %
500 INTRAVENOUS SOLUTION INTRAVENOUS ONCE
Status: COMPLETED | OUTPATIENT
Start: 2018-11-22 | End: 2018-11-22

## 2018-11-22 RX ORDER — DIVALPROEX SODIUM 250 MG/1
1000 TABLET, EXTENDED RELEASE ORAL DAILY
Status: DISCONTINUED | OUTPATIENT
Start: 2018-11-22 | End: 2018-11-22

## 2018-11-22 RX ORDER — BENZTROPINE MESYLATE 0.5 MG/1
0.5 TABLET ORAL 2 TIMES DAILY
Status: DISCONTINUED | OUTPATIENT
Start: 2018-11-22 | End: 2018-11-22

## 2018-11-22 RX ORDER — PRIMIDONE 50 MG/1
50 TABLET ORAL NIGHTLY
Status: DISCONTINUED | OUTPATIENT
Start: 2018-11-22 | End: 2018-11-22

## 2018-11-22 RX ORDER — CLONAZEPAM 0.5 MG/1
1 TABLET ORAL 2 TIMES DAILY
Status: DISCONTINUED | OUTPATIENT
Start: 2018-11-22 | End: 2018-11-23 | Stop reason: CLARIF

## 2018-11-22 RX ORDER — SODIUM CHLORIDE 0.9 % (FLUSH) 0.9 %
10 SYRINGE (ML) INJECTION EVERY 12 HOURS SCHEDULED
Status: DISCONTINUED | OUTPATIENT
Start: 2018-11-22 | End: 2018-11-24 | Stop reason: HOSPADM

## 2018-11-22 RX ORDER — SODIUM CHLORIDE 9 MG/ML
INJECTION, SOLUTION INTRAVENOUS CONTINUOUS
Status: DISCONTINUED | OUTPATIENT
Start: 2018-11-22 | End: 2018-11-24 | Stop reason: HOSPADM

## 2018-11-22 RX ORDER — TAMSULOSIN HYDROCHLORIDE 0.4 MG/1
0.4 CAPSULE ORAL EVERY EVENING
Status: DISCONTINUED | OUTPATIENT
Start: 2018-11-22 | End: 2018-11-24 | Stop reason: HOSPADM

## 2018-11-22 RX ORDER — TAMSULOSIN HYDROCHLORIDE 0.4 MG/1
0.4 CAPSULE ORAL EVERY EVENING
Status: DISCONTINUED | OUTPATIENT
Start: 2018-11-22 | End: 2018-11-22

## 2018-11-22 RX ORDER — BENZTROPINE MESYLATE 0.5 MG/1
0.5 TABLET ORAL 2 TIMES DAILY
Status: DISCONTINUED | OUTPATIENT
Start: 2018-11-22 | End: 2018-11-24 | Stop reason: HOSPADM

## 2018-11-22 RX ORDER — FINASTERIDE 5 MG/1
5 TABLET, FILM COATED ORAL DAILY
Status: DISCONTINUED | OUTPATIENT
Start: 2018-11-22 | End: 2018-11-22

## 2018-11-22 RX ORDER — PROPRANOLOL HYDROCHLORIDE 10 MG/1
40 TABLET ORAL 2 TIMES DAILY
Status: DISCONTINUED | OUTPATIENT
Start: 2018-11-22 | End: 2018-11-22

## 2018-11-22 RX ORDER — RISPERIDONE 2 MG/1
2 TABLET, FILM COATED ORAL NIGHTLY
Status: DISCONTINUED | OUTPATIENT
Start: 2018-11-22 | End: 2018-11-22

## 2018-11-22 RX ORDER — ONDANSETRON 2 MG/ML
4 INJECTION INTRAMUSCULAR; INTRAVENOUS EVERY 6 HOURS PRN
Status: DISCONTINUED | OUTPATIENT
Start: 2018-11-22 | End: 2018-11-24 | Stop reason: HOSPADM

## 2018-11-22 RX ORDER — GABAPENTIN 300 MG/1
300 CAPSULE ORAL DAILY
Status: DISCONTINUED | OUTPATIENT
Start: 2018-11-22 | End: 2018-11-22

## 2018-11-22 RX ORDER — PROPRANOLOL HYDROCHLORIDE 10 MG/1
40 TABLET ORAL 2 TIMES DAILY
Status: DISCONTINUED | OUTPATIENT
Start: 2018-11-22 | End: 2018-11-23 | Stop reason: CLARIF

## 2018-11-22 RX ORDER — CLONAZEPAM 0.5 MG/1
1 TABLET ORAL 2 TIMES DAILY
Status: DISCONTINUED | OUTPATIENT
Start: 2018-11-22 | End: 2018-11-22

## 2018-11-22 RX ORDER — GABAPENTIN 300 MG/1
300 CAPSULE ORAL DAILY
Status: DISCONTINUED | OUTPATIENT
Start: 2018-11-23 | End: 2018-11-24 | Stop reason: HOSPADM

## 2018-11-22 RX ORDER — PRIMIDONE 50 MG/1
50 TABLET ORAL NIGHTLY
Status: DISCONTINUED | OUTPATIENT
Start: 2018-11-22 | End: 2018-11-24 | Stop reason: HOSPADM

## 2018-11-22 RX ORDER — DIVALPROEX SODIUM 250 MG/1
1000 TABLET, EXTENDED RELEASE ORAL DAILY
Status: DISCONTINUED | OUTPATIENT
Start: 2018-11-23 | End: 2018-11-23

## 2018-11-22 RX ORDER — RISPERIDONE 2 MG/1
2 TABLET, FILM COATED ORAL NIGHTLY
Status: DISCONTINUED | OUTPATIENT
Start: 2018-11-22 | End: 2018-11-24 | Stop reason: HOSPADM

## 2018-11-22 RX ADMIN — SODIUM CHLORIDE: 9 INJECTION, SOLUTION INTRAVENOUS at 15:44

## 2018-11-22 RX ADMIN — ENOXAPARIN SODIUM 40 MG: 40 INJECTION SUBCUTANEOUS at 15:43

## 2018-11-22 RX ADMIN — DIVALPROEX SODIUM 1000 MG: 250 TABLET, EXTENDED RELEASE ORAL at 17:11

## 2018-11-22 RX ADMIN — PROPRANOLOL HYDROCHLORIDE 40 MG: 10 TABLET ORAL at 17:10

## 2018-11-22 RX ADMIN — PRIMIDONE 50 MG: 50 TABLET ORAL at 20:24

## 2018-11-22 RX ADMIN — TAMSULOSIN HYDROCHLORIDE 0.4 MG: 0.4 CAPSULE ORAL at 17:13

## 2018-11-22 RX ADMIN — CLONAZEPAM 1 MG: 0.5 TABLET ORAL at 17:09

## 2018-11-22 RX ADMIN — BENZTROPINE MESYLATE 0.5 MG: 0.5 TABLET ORAL at 17:12

## 2018-11-22 RX ADMIN — RISPERIDONE 2 MG: 2 TABLET, FILM COATED ORAL at 20:24

## 2018-11-22 RX ADMIN — FINASTERIDE 5 MG: 5 TABLET, FILM COATED ORAL at 17:11

## 2018-11-22 RX ADMIN — GABAPENTIN 300 MG: 300 CAPSULE ORAL at 17:12

## 2018-11-22 RX ADMIN — SODIUM CHLORIDE 500 ML: 9 INJECTION, SOLUTION INTRAVENOUS at 13:06

## 2018-11-22 ASSESSMENT — PAIN DESCRIPTION - DESCRIPTORS
DESCRIPTORS: ACHING
DESCRIPTORS: ACHING

## 2018-11-22 ASSESSMENT — PAIN DESCRIPTION - LOCATION
LOCATION: LEG
LOCATION: LEG

## 2018-11-22 ASSESSMENT — PAIN DESCRIPTION - PAIN TYPE
TYPE: ACUTE PAIN
TYPE: ACUTE PAIN

## 2018-11-22 ASSESSMENT — PAIN SCALES - GENERAL
PAINLEVEL_OUTOF10: 0
PAINLEVEL_OUTOF10: 3
PAINLEVEL_OUTOF10: 5
PAINLEVEL_OUTOF10: 2

## 2018-11-22 ASSESSMENT — PAIN DESCRIPTION - ORIENTATION
ORIENTATION: LEFT
ORIENTATION: LEFT

## 2018-11-22 NOTE — ED PROVIDER NOTES
eMERGENCY dEPARTMENT eNCOUnter      [address] [facility]    Chief Complaint   Patient presents with   Gonzalez Fall     lost balancegeeting out of chair with walker yesterday, had laid on floor until discovered by apt manager who called EMS. IS cold, denies neck and head pain, c/o leg stiffness Immobilization equipment discontinued on arrival per Dr Carolyn GELLER    Awilda Serrano is a 59 y.o. male who presentsto ED from home. By EMS. With complaint of   Onset this yesterday. Patient fell out of chair yesterday. He normally is able tablet with a walker. Patient was not able to get up. Patient was discovered this morning by the  on the floor. Patient denies neck pain. Patient was incontinent. Patient complains of stiffness in his body. Patient has history of schizophrenia. Patient was a hospital.  Patient has Parkinsonian tremor. Patient has history of diabetes. PAST MEDICAL HISTORY    Past Medical History:   Diagnosis Date    Chronic schizophrenic ([address]     Hypertension     Neuromuscular disorder ([facility]     \"slight case of Parkinsons\"    Type II or unspecified type diabetes mellitus without mention of complication, not stated as uncontrolled        SURGICAL HISTORY    Past Surgical History:   Procedure Laterality Date    COLONOSCOPY  01/2014    Morteza HANSON    COLONOSCOPY  01/07/14    colon polyp    COLONOSCOPY N/A 8/7/2018    COLONOSCOPY POLYPECTOMY SNARE/COLD BIOPSY performed by Perla Sampson MD at [facility]      right foot    HEMORRHOID SURGERY         CURRENT MEDICATIONS    Current Outpatient Rx   Medication Sig Dispense Refill    furosemide (LASIX) 20 MG tablet Take 1 tablet by mouth daily 5 tablet 1    Psyllium (METAMUCIL FIBER PO) Take by mouth daily       gabapentin (NEURONTIN) 300 MG capsule Take 1 capsule by mouth daily for 180 days. . 90 capsule 1    tamsulosin (FLOMAX) 0.4 MG capsule Take 1 capsule by mouth every evening 30 capsule 11    finasteride within normal limits   COMPREHENSIVE METABOLIC PANEL - Abnormal; Notable for the following:     Glucose 129 (*)     BUN 27 (*)     Bun/Cre Ratio 33 (*)     All other components within normal limits   MYOGLOBIN, SERUM - Abnormal; Notable for the following:     Myoglobin 470 (*)     All other components within normal limits   URINALYSIS             Summation      Patient Course: Patient will be admitted for observation.  consult for assisted living nursing home placement. ED Medications administered this visit:    Medications   0.9 % sodium chloride bolus (500 mLs Intravenous New Bag 11/22/18 2776)       New Prescriptions from this visit:    New Prescriptions    No medications on file       Follow-up:  No follow-up provider specified. Final Impression:   1. Fall, initial encounter    2. General weakness    3.  Parkinsonian tremor ([address]               (Please note that portions of this note were completed with a voice recognition program.  Efforts were made to edit the dictations but occasionally words are mis-transcribed.)        Lonn Severs, MD  11/22/18 4471

## 2018-11-23 LAB
ABSOLUTE EOS #: 0.1 K/UL (ref 0–0.4)
ABSOLUTE IMMATURE GRANULOCYTE: ABNORMAL K/UL (ref 0–0.3)
ABSOLUTE LYMPH #: 1.4 K/UL (ref 1–4.8)
ABSOLUTE MONO #: 0.6 K/UL (ref 0–1)
ANION GAP SERPL CALCULATED.3IONS-SCNC: 8 MMOL/L (ref 9–17)
BASOPHILS # BLD: 0 % (ref 0–2)
BASOPHILS ABSOLUTE: 0 K/UL (ref 0–0.2)
BUN BLDV-MCNC: 21 MG/DL (ref 8–23)
BUN/CREAT BLD: 24 (ref 9–20)
CALCIUM SERPL-MCNC: 8.6 MG/DL (ref 8.6–10.4)
CHLORIDE BLD-SCNC: 109 MMOL/L (ref 98–107)
CO2: 26 MMOL/L (ref 20–31)
CREAT SERPL-MCNC: 0.86 MG/DL (ref 0.7–1.2)
DIFFERENTIAL TYPE: ABNORMAL
EOSINOPHILS RELATIVE PERCENT: 1 % (ref 0–5)
GFR AFRICAN AMERICAN: >60 ML/MIN
GFR NON-AFRICAN AMERICAN: >60 ML/MIN
GFR SERPL CREATININE-BSD FRML MDRD: ABNORMAL ML/MIN/{1.73_M2}
GFR SERPL CREATININE-BSD FRML MDRD: ABNORMAL ML/MIN/{1.73_M2}
GLUCOSE BLD-MCNC: 112 MG/DL (ref 70–99)
HCT VFR BLD CALC: 38 % (ref 41–53)
HEMOGLOBIN: 12.7 G/DL (ref 13.5–17.5)
IMMATURE GRANULOCYTES: ABNORMAL %
LYMPHOCYTES # BLD: 23 % (ref 13–44)
MAGNESIUM: 1.9 MG/DL (ref 1.6–2.6)
MCH RBC QN AUTO: 31.4 PG (ref 26–34)
MCHC RBC AUTO-ENTMCNC: 33.3 G/DL (ref 31–37)
MCV RBC AUTO: 94.3 FL (ref 80–100)
MONOCYTES # BLD: 10 % (ref 5–9)
MORPHOLOGY: ABNORMAL
MYOGLOBIN: 164 NG/ML (ref 28–72)
NRBC AUTOMATED: ABNORMAL PER 100 WBC
PDW BLD-RTO: 14.4 % (ref 12.1–15.2)
PLATELET # BLD: 94 K/UL (ref 140–450)
PLATELET ESTIMATE: ABNORMAL
PMV BLD AUTO: ABNORMAL FL (ref 6–12)
POTASSIUM SERPL-SCNC: 3.9 MMOL/L (ref 3.7–5.3)
RBC # BLD: 4.04 M/UL (ref 4.5–5.9)
RBC # BLD: ABNORMAL 10*6/UL
SEG NEUTROPHILS: 66 % (ref 39–75)
SEGMENTED NEUTROPHILS ABSOLUTE COUNT: 4.2 K/UL (ref 2.1–6.5)
SODIUM BLD-SCNC: 143 MMOL/L (ref 135–144)
WBC # BLD: 6.3 K/UL (ref 3.5–11)
WBC # BLD: ABNORMAL 10*3/UL

## 2018-11-23 PROCEDURE — 97162 PT EVAL MOD COMPLEX 30 MIN: CPT

## 2018-11-23 PROCEDURE — 90686 IIV4 VACC NO PRSV 0.5 ML IM: CPT | Performed by: INTERNAL MEDICINE

## 2018-11-23 PROCEDURE — G8979 MOBILITY GOAL STATUS: HCPCS

## 2018-11-23 PROCEDURE — 96372 THER/PROPH/DIAG INJ SC/IM: CPT

## 2018-11-23 PROCEDURE — G8978 MOBILITY CURRENT STATUS: HCPCS

## 2018-11-23 PROCEDURE — 36415 COLL VENOUS BLD VENIPUNCTURE: CPT

## 2018-11-23 PROCEDURE — G0378 HOSPITAL OBSERVATION PER HR: HCPCS

## 2018-11-23 PROCEDURE — 97535 SELF CARE MNGMENT TRAINING: CPT

## 2018-11-23 PROCEDURE — G0008 ADMIN INFLUENZA VIRUS VAC: HCPCS | Performed by: INTERNAL MEDICINE

## 2018-11-23 PROCEDURE — G8988 SELF CARE GOAL STATUS: HCPCS

## 2018-11-23 PROCEDURE — 94761 N-INVAS EAR/PLS OXIMETRY MLT: CPT

## 2018-11-23 PROCEDURE — 80048 BASIC METABOLIC PNL TOTAL CA: CPT

## 2018-11-23 PROCEDURE — 83874 ASSAY OF MYOGLOBIN: CPT

## 2018-11-23 PROCEDURE — 97165 OT EVAL LOW COMPLEX 30 MIN: CPT

## 2018-11-23 PROCEDURE — 6360000002 HC RX W HCPCS: Performed by: INTERNAL MEDICINE

## 2018-11-23 PROCEDURE — 85025 COMPLETE CBC W/AUTO DIFF WBC: CPT

## 2018-11-23 PROCEDURE — 83735 ASSAY OF MAGNESIUM: CPT

## 2018-11-23 PROCEDURE — G8987 SELF CARE CURRENT STATUS: HCPCS

## 2018-11-23 PROCEDURE — G8980 MOBILITY D/C STATUS: HCPCS

## 2018-11-23 PROCEDURE — 2580000003 HC RX 258: Performed by: INTERNAL MEDICINE

## 2018-11-23 RX ORDER — DIVALPROEX SODIUM 500 MG/1
1000 TABLET, EXTENDED RELEASE ORAL DAILY
Status: DISCONTINUED | OUTPATIENT
Start: 2018-11-24 | End: 2018-11-24 | Stop reason: HOSPADM

## 2018-11-23 RX ORDER — PROPRANOLOL HYDROCHLORIDE 40 MG/1
40 TABLET ORAL 2 TIMES DAILY
Status: DISCONTINUED | OUTPATIENT
Start: 2018-11-23 | End: 2018-11-24 | Stop reason: HOSPADM

## 2018-11-23 RX ORDER — CLONAZEPAM 1 MG/1
1 TABLET ORAL 2 TIMES DAILY
Status: DISCONTINUED | OUTPATIENT
Start: 2018-11-23 | End: 2018-11-24 | Stop reason: HOSPADM

## 2018-11-23 RX ADMIN — CLONAZEPAM 1 MG: 1 TABLET ORAL at 21:52

## 2018-11-23 RX ADMIN — TAMSULOSIN HYDROCHLORIDE 0.4 MG: 0.4 CAPSULE ORAL at 17:21

## 2018-11-23 RX ADMIN — CLONAZEPAM 1 MG: 0.5 TABLET ORAL at 09:27

## 2018-11-23 RX ADMIN — DIVALPROEX SODIUM 1000 MG: 250 TABLET, EXTENDED RELEASE ORAL at 09:28

## 2018-11-23 RX ADMIN — GABAPENTIN 300 MG: 300 CAPSULE ORAL at 09:29

## 2018-11-23 RX ADMIN — FINASTERIDE 5 MG: 5 TABLET, FILM COATED ORAL at 09:29

## 2018-11-23 RX ADMIN — BENZTROPINE MESYLATE 0.5 MG: 0.5 TABLET ORAL at 09:28

## 2018-11-23 RX ADMIN — SODIUM CHLORIDE: 9 INJECTION, SOLUTION INTRAVENOUS at 22:00

## 2018-11-23 RX ADMIN — INFLUENZA VIRUS VACCINE 0.5 ML: 15; 15; 15; 15 SUSPENSION INTRAMUSCULAR at 09:39

## 2018-11-23 RX ADMIN — SODIUM CHLORIDE: 9 INJECTION, SOLUTION INTRAVENOUS at 00:51

## 2018-11-23 RX ADMIN — PROPRANOLOL HYDROCHLORIDE 40 MG: 40 TABLET ORAL at 21:51

## 2018-11-23 RX ADMIN — RISPERIDONE 2 MG: 2 TABLET, FILM COATED ORAL at 21:51

## 2018-11-23 RX ADMIN — BENZTROPINE MESYLATE 0.5 MG: 0.5 TABLET ORAL at 21:52

## 2018-11-23 RX ADMIN — PROPRANOLOL HYDROCHLORIDE 40 MG: 10 TABLET ORAL at 09:30

## 2018-11-23 RX ADMIN — ENOXAPARIN SODIUM 40 MG: 40 INJECTION SUBCUTANEOUS at 09:39

## 2018-11-23 RX ADMIN — SODIUM CHLORIDE: 9 INJECTION, SOLUTION INTRAVENOUS at 11:53

## 2018-11-23 RX ADMIN — PRIMIDONE 50 MG: 50 TABLET ORAL at 21:51

## 2018-11-23 ASSESSMENT — PAIN SCALES - GENERAL
PAINLEVEL_OUTOF10: 0

## 2018-11-23 NOTE — H&P
History & Physical    Patient:  Crow Simmons  YOB: 1955  Date of Service: 11/22/2018  MRN: [id number]   Acct:   [de-identified]   Primary Care Physician: Richard Young MD    Chief Complaint:   Chief Complaint   Patient presents with   Garcia Backer Fall     lost balancegeeting out of chair with walker yesterday, had laid on floor until discovered by apt manager who called EMS. IS cold, denies neck and head pain, c/o leg stiffness Immobilization equipment discontinued on arrival per Dr Chin Savage       History of Present Illness: The patient is a 59 y.o. male presented to the emergency room by EMS for evaluation of fall at home. The patient apparently was found on the floor by his  called EMS. [facility] that he [facility] by himself and the  and his family check on him frequently. He has a history of schizophrenia being managed by Dr. Dariel Fitzgerald, also reports history of fibromyalgia and some sort of neuromuscular disorder. He reports feeling weak for the past 3 days, has been having difficulty getting around. Yesterday he was sitting on the chair and for some reason was leaning to the right side and could not control his body and ended up sliding down to the floor. He believes he remained on the floor for at least 12-14 hours until the  checked on him. He denies having any pain, reports no headaches, no nausea, vomiting, no abdominal pain, no diarrhea. States appetite has been good. Reports no fevers or chills. Workup in the emergency room revealed stable vitals. BMP was unremarkable except elevated BUN 27. Myoglobin was elevated to 470, liver function test was normal, CBC revealed normal WBC count and H&H, mild thrombocytopenia 107. UA was unremarkable, without evidence of UTI.   Due to profound weakness the patient was deemed appropriate for admission for PT and OT evaluation and possible ECF arrangement      Past Medical History: ONE TOUCH ULTRA DELICA) 585 each 3    Lancet Device [facility] Delico Lancet Device. Use daily 100 each 3       Allergies:  Patient has no allergy information on record. Social History:    reports that he has never smoked. He has never used smokeless tobacco. He reports that he does not drink alcohol or use drugs. Family History:   Family History   Problem Relation Age of Onset    Diabetes Father     Diabetes Sister     Diabetes Brother     Diabetes Sister     Arthritis Mother        Review of systems:  Constitutional: no fever, no night sweats, positiv fatigue  Head: no headache, no head injury  Eye: no blurring of vision, no double vision. Ears: no hearing difficulty, no tinnitus  Mouth/throat: no sore throat, no dysphagia  Lungs: Occasional cough, no shortness of breath, no wheeze  CVS: no palpitation, no chest pain, no shortness of breath  GI: no abdominal pain, positive nausea , no vomiting, no constipation  DARREN: no dysuria, frequency and urgency, no hematuria  Musculoskeletal: no joint pain, swelling , stiffness  Endocrine: no polyuria, polydypsia, no cold or heat intolerence  Hematology: no anemia, no easy brusing or bleeding, no hx of clotting disorder  Dermatology: no skin rash  Psychiatry: Positive for schizophrenia  Neurology: no syncope, no seizures, positive for tremor in upper arms, no speech changes, no numbness in arms and legs         Vitals:   Vitals:    11/22/18 2000   BP: 100/64   Pulse: 80   Resp: 18   Temp: 98.2 °F (36.8 °C)   SpO2: 97%      BMI: Body mass index is 25.79 kg/m².     Physical Exam:  General Appearance: alert and oriented to person, place and time, in no acute distress  Cardiovascular: normal rate, regular rhythm, normal S1 and S2, no murmurs, rubs, clicks, or gallops, distal pulses intact, no carotid bruits, no JVD  Pulmonary/Chest: clear to auscultation bilaterally- no wheezes, rales or rhonchi, normal air movement, no respiratory distress  Abdomen: soft, non-tender, %    Platelets 063 (L) 967 - 450 k/uL    MPV NOT REPORTED 6.0 - 12.0 fL    NRBC Automated NOT REPORTED per 100 WBC    Differential Type YES     Immature Granulocytes NOT REPORTED 0 %    Absolute Immature Granulocyte NOT REPORTED 0.00 - 0.30 k/uL    WBC Morphology NOT REPORTED     RBC Morphology NOT REPORTED     Platelet Estimate NOT REPORTED     Seg Neutrophils 87 (H) 39 - 75 %    Lymphocytes 8 (L) 13 - 44 %    Monocytes 5 5 - 9 %    Eosinophils % 0 0 - 5 %    Basophils 0 0 - 2 %    Segs Absolute 7.20 (H) 2.1 - 6.5 k/uL    Absolute Lymph # 0.60 (L) 1.0 - 4.8 k/uL    Absolute Mono # 0.40 0.0 - 1.0 k/uL    Absolute Eos # 0.00 0.0 - 0.4 k/uL    Basophils # 0.00 0.0 - 0.2 k/uL   Comprehensive Metabolic Panel    Collection Time: 11/22/18 12:12 PM   Result Value Ref Range    Glucose 129 (H) 70 - 99 mg/dL    BUN 27 (H) 8 - 23 mg/dL    CREATININE 0.82 0.70 - 1.20 mg/dL    Bun/Cre Ratio 33 (H) 9 - 20    Calcium 9.3 8.6 - 10.4 mg/dL    Sodium 142 135 - 144 mmol/L    Potassium 3.7 3.7 - 5.3 mmol/L    Chloride 103 98 - 107 mmol/L    CO2 28 20 - 31 mmol/L    Anion Gap 11 9 - 17 mmol/L    Alkaline Phosphatase 49 40 - 129 U/L    ALT 12 5 - 41 U/L    AST 17 <40 U/L    Total Bilirubin 0.56 0.30 - 1.20 mg/dL    Total Protein 7.2 6.4 - 8.3 g/dL    Alb 4.3 3.5 - 5.2 g/dL    Albumin/Globulin Ratio NOT REPORTED 1.0 - 2.5    GFR Non-African American >60 >60 mL/min    GFR African American >60 >60 mL/min    GFR Comment          GFR Staging NOT REPORTED    Myoglobin, Serum    Collection Time: 11/22/18 12:12 PM   Result Value Ref Range    Myoglobin 470 (H) 28 - 72 ng/mL       Radiology:     No results found. Assessment/ Plan:    1. Mild rhabdomyolysis secondary to fall - the patient is admitted for observation, he is on IV fluids, follow-up with myoglobin level tomorrow  2. Status post fall, history of recurrent falls - consult PT and OT, may need ECF placement  3. Hypertension - resume home medications, blood pressure stable  4.

## 2018-11-24 VITALS
WEIGHT: 184.9 LBS | OXYGEN SATURATION: 97 % | DIASTOLIC BLOOD PRESSURE: 56 MMHG | SYSTOLIC BLOOD PRESSURE: 104 MMHG | BODY MASS INDEX: 25.89 KG/M2 | HEIGHT: 71 IN | TEMPERATURE: 98 F | HEART RATE: 60 BPM | RESPIRATION RATE: 18 BRPM

## 2018-11-24 PROCEDURE — G0378 HOSPITAL OBSERVATION PER HR: HCPCS

## 2018-11-24 PROCEDURE — 2580000003 HC RX 258: Performed by: INTERNAL MEDICINE

## 2018-11-24 PROCEDURE — 97530 THERAPEUTIC ACTIVITIES: CPT

## 2018-11-24 PROCEDURE — 6360000002 HC RX W HCPCS: Performed by: INTERNAL MEDICINE

## 2018-11-24 PROCEDURE — 96372 THER/PROPH/DIAG INJ SC/IM: CPT

## 2018-11-24 PROCEDURE — 94761 N-INVAS EAR/PLS OXIMETRY MLT: CPT

## 2018-11-24 RX ADMIN — FINASTERIDE 5 MG: 5 TABLET, FILM COATED ORAL at 10:09

## 2018-11-24 RX ADMIN — DIVALPROEX SODIUM 1000 MG: 500 TABLET, EXTENDED RELEASE ORAL at 10:09

## 2018-11-24 RX ADMIN — SODIUM CHLORIDE: 9 INJECTION, SOLUTION INTRAVENOUS at 07:36

## 2018-11-24 RX ADMIN — GABAPENTIN 300 MG: 300 CAPSULE ORAL at 10:09

## 2018-11-24 RX ADMIN — PROPRANOLOL HYDROCHLORIDE 40 MG: 40 TABLET ORAL at 10:09

## 2018-11-24 RX ADMIN — ENOXAPARIN SODIUM 40 MG: 40 INJECTION SUBCUTANEOUS at 10:08

## 2018-11-24 RX ADMIN — CLONAZEPAM 1 MG: 1 TABLET ORAL at 10:09

## 2018-11-24 ASSESSMENT — PAIN SCALES - GENERAL
PAINLEVEL_OUTOF10: 0

## 2018-11-24 NOTE — DISCHARGE SUMMARY
his body and ended up sliding down to the floor. He believes he remained on the floor for at least 12-14 hours until the  checked on him. He denies having any pain, reports no headaches, no nausea, vomiting, no abdominal pain, no diarrhea. States appetite has been good. Reports no fevers or chills. Workup in the emergency room revealed stable vitals. BMP was unremarkable except elevated BUN 27. Myoglobin was elevated to 470, liver function test was normal, CBC revealed normal WBC count and H&H, mild thrombocytopenia 107. UA was unremarkable, without evidence of UTI. Due to profound weakness the patient was deemed appropriate for admission. He had elevated myoglobin level of 470. Renal function was normal.  He was given IV fluids. Myoglobin levels trended down. He had no pains, no nausea, no vomiting or diarrhea. He was evaluated by PT and OT. ECF or home health care was advised. Daphne Kawasaki reports feeling a lot better and stronger and would like to return home with home health care to check on his vitals and helping with medications. ECF possibility was also discussed, however due to likelihood of private pay he did not want it arranged for now. He states that he would rather return home and try to be as independent as before. The situation was discussed with his sister Jarrett Jacobson on the phone today. Again, they understand that ECF placement will most likely be via private pay and it is not a desirable plan for them. Jarrett Jacobson is concerned potential falls at home. We have contacted  today and home health care is arranged for tomorrow to visit the patient. I also ordered physical therapy at home. Jarrett Jacobson and Daphne Kawasaki are in agreement with this plan. Since he stable and no other acute issues are present I believe it's reasonable to discharge the patient home with home health care. He is to follow-up with PCP next week.      Disposition: Home with home health

## 2018-11-26 ENCOUNTER — CARE COORDINATION (OUTPATIENT)
Dept: CASE MANAGEMENT | Age: 63
End: 2018-11-26

## 2018-11-26 ENCOUNTER — CARE COORDINATION (OUTPATIENT)
Dept: CARE COORDINATION | Age: 63
End: 2018-11-26

## 2018-11-26 DIAGNOSIS — M62.82 NON-TRAUMATIC RHABDOMYOLYSIS: Primary | ICD-10-CM

## 2018-11-26 PROCEDURE — 1111F DSCHRG MED/CURRENT MED MERGE: CPT | Performed by: FAMILY MEDICINE

## 2018-11-26 NOTE — CARE COORDINATION
Phone call back to [facility] to follow up and see if patient able to go to facility; spoke to Dory Evans not available.  Requested to have Kimberly give this nurse CC a call back at [phone]

## 2018-11-26 NOTE — PROGRESS NOTES
Complete bed bath and linen change, pt brushes teeth independently with set up, VS completed
Follow up voice mail message left for [facility] Case  re:  Patients recent hospital stay, home care referral and falls at home. Telephone number provided if questions arise.     [address]  11/26/2018
[facility]  Occupational Therapy  Daily Note  Date: 2018  Patient Name: Gladys Posey        MRN: [id number]    : 1955  (59 y.o.)    Subjective: Pt in chair upon arrival    Objective  ADL     Feeding: Setup  Grooming: Setup, Verbal cueing, Increased time to complete  UE Bathing: Setup, Verbal cueing, Increased time to complete  LE Bathing: Setup, Verbal cueing, Increased time to complete, Moderate assistance  UE Dressing: Setup, Verbal cueing, Increased time to complete  LE Dressing: Setup, Verbal cueing, Increased time to complete, Moderate assistance  Toileting: Increased time to complete, Setup, Minimal assistance  Toilet Transfer: Contact guard assistance        Supine to Sit: Minimal assistance        Sit to stand: Minimal assistance  Stand to sit: Contact guard assistance                     Balance  Sitting Balance: Contact guard assistance  Standing Balance  Sit to stand: Minimal assistance  Stand to sit: Contact guard assistance    Assessment  Assessment: Pt in chair upon arrival & agreeable to OT treat. Pt educated on & provided demonstration regarding AE of reacher/sock aid/ long handed sponge. Pt states he is very grateful for AE eduation as he feels the AE will ease his struggle w/ LB dressing. Pt requires extra time & verbal prompting for correct use of sock aid & reacher for donning/doffing JACK socks. Pt requests BR at this time. STS from chair is Min A, ambulates to BR w. FWW & CGA. Verbal prompting needed to continue taking steps to toilet secondary Parkinson's. Remains on toilet as pt states he will try to have BM. Instructed to pull call light when finished, pt V/U. RN notified of pt on toilet.    Prognosis: Fair         Goals  Short Term Goals  Time Frame for Short term goals: 3 days (18)  Short term goal 1: Pt to complete toileting tasks Mod I  Short term goal 2: Pt to complete UB/LB bathing & dressing w/ s/u & supervision only  Short term goal 3: Pt to tolerate
Nutrition Assessment    Type and Reason for Visit: Initial (Nutrition Screen)    Nutrition Recommendations:   1. Continue current diet. 2. 4 oz Glucerna BID  3. Provide Low Sodium MNT (handout)    Nutrition Assessment:   Pt has a good appetite, and is eating well. Acute wt change (down 5.6% x 1 week) due to loss in edema. Note wt loss is greater than stated weight at admission. Will monitor weights. Pt reports he cooks for self at home, and is interested in receiving low sodium diet plan. He states he does not read labels. Education will be given to  per pt. Malnutrition Assessment:  · Malnutrition Status: Mild Malnutrition  · Context: Chronic illness  · Findings of the 6 clinical characteristics of malnutrition (Minimum of 2 out of 6 clinical characteristics is required to make the diagnosis of moderate or severe Protein Calorie Malnutrition based on AND/ASPEN Guidelines):  1. Energy Intake- (greater than 75%), not able to assess    2. Weight Loss-10% loss or greater, in 1 week  3. Fat Loss-Mild subcutaneous fat loss,    4. Muscle Loss-Mild muscle mass loss,    5. Fluid Accumulation-Moderate to severe fluid accumulation, Extremities  6.  Strength-Not measured    Nutrition Risk Level:  Moderate    Nutrient Needs:  · Estimated Daily Total Kcal: 0015-9134  · Estimated Daily Protein (g):   · Estimated Daily Total Fluid (ml/day): 7161-4368    Nutrition Diagnosis:   · Problem: Food and nutrition-related knowledge deficit  · Etiology: related to Lack of prior nutrition-related education     Signs and symptoms:  as evidenced by Mild muscle loss, Mild loss of subcutaneous fat (wt loss of 5.6% x 1 week)    Objective Information:  · Nutrition-Focused Physical Findings: Abd: WDL, BLE edema: +2 pitting  · Wound Type: None  · Current Nutrition Therapies:  · Oral Diet Orders: General   · Oral Diet intake: %  · Oral Nutrition Supplement (ONS) Orders: None  · ONS intake: Unable to
Pt resting well, uses call light for assistance with urinal or bedpan when needed, IV patent fluids running, on phone with family earlier in the night,
Report rec'd bedside. Call light in reach. 1467 Repositioned. Set up assist for breakfast. Pt A&ox4. Denies pain. Call light in reach.
independent  Stand to sit: Modified independent           Ambulation 1  Device: Rolling Walker  Quality of Gait: slowed, shuffling steps with shortened stride, no loss of balance  Distance: 10 ftx2  Balance  Sitting - Static: Good  Sitting - Dynamic: Good  Standing - Static: Fair  Standing - Dynamic: Fair, -    Assessment      Body structures, Functions, Activity limitations: Decreased functional mobility , Decreased strength, Decreased balance  Chart Reviewed: Yes  Assessment: Patient transfer sit to stand independent from chair with w.walker and ambulated 10 ft x2 with w.walker independent. Patient then to bathroom to use toilet, he transfered on and off toilet with grab bar independent. Dynamic standing balance without device fair-; recommend use of w.walker. Patient is independent with transfer sit to stand and walking with wheeled walker but does have decrease balance and safety concern. Recommend SNF or home health care. Prognosis: Good       [x] Primary Impairment : Mobility [] Secondary Impairment : NA        G Code:    [x] Mobility         [] Carry        [] Body Position       [] Self Care      [] Other:   Functional Impairment Current:  [] 0%    [] 1-19% [] 20-39% [x] 40-59% [] 60-79%    [] 80-99% [] 100%    Functional Impairment Discharge:  [] 0%    [] 1-19% [] 20-39% [x] 40-59% [] 60-79%    [] 80-99% [] 100%    G Code Functional Impairment determined by:  [x] Clinical Judgment   [] Outcome Measure:        Goals     Long term goals  Time Frame for Long term goals : NA, evaluation only- Patient is independent with transfer sit to stand and walking with wheeled walker with shuffling parkinson's gait pattern but was steady. Patient dynamic standing balance fair but hx multiple falls. Safety concerns due to falls, recommend SNF or home health care. Time In: 11:00  Time Out: 11:40  Timed Coded Minutes:    Total Treatment Time: Mamta Ang, JOSE 11/23/2018
term goals: 3 days (11/25/18)  Short term goal 1: Pt to complete toileting tasks Mod I  Short term goal 2: Pt to complete UB/LB bathing & dressing w/ s/u & supervision only  Short term goal 3: Pt to tolerate JACK UB functional activity/exercises x 5 min w/o SOB & 2 or fewer rest breaks  Short term goal 4: Pt to complete kitchen safety task w/ Mod I & FWW w/ good safety awareness  Short term goal 5: Pt to be educated on AE for LB B/D & EC/WS strategies to limit fatigue w/ ADL/IADLs    Plan  REQUIRES OT FOLLOW UP: Yes    Times per week: 3x  Times per day: Daily (1-2x daily)       Time In: 838  Time Out: 914  Timed Coded Minutes: 18  Total Treatment Time: 36    AILEEN Madrigal,OTR/L  11/23/2018

## 2018-11-27 ENCOUNTER — TELEPHONE (OUTPATIENT)
Dept: FAMILY MEDICINE CLINIC | Age: 63
End: 2018-11-27

## 2018-11-28 ENCOUNTER — OFFICE VISIT (OUTPATIENT)
Dept: FAMILY MEDICINE CLINIC | Age: 63
End: 2018-11-28
Payer: MEDICARE

## 2018-11-28 ENCOUNTER — CARE COORDINATION (OUTPATIENT)
Dept: CARE COORDINATION | Age: 63
End: 2018-11-28

## 2018-11-28 VITALS
BODY MASS INDEX: 25.31 KG/M2 | HEIGHT: 73 IN | DIASTOLIC BLOOD PRESSURE: 60 MMHG | WEIGHT: 191 LBS | SYSTOLIC BLOOD PRESSURE: 100 MMHG | HEART RATE: 68 BPM

## 2018-11-28 DIAGNOSIS — I10 ESSENTIAL HYPERTENSION, BENIGN: ICD-10-CM

## 2018-11-28 DIAGNOSIS — E11.9 TYPE 2 DIABETES MELLITUS WITHOUT COMPLICATION, WITHOUT LONG-TERM CURRENT USE OF INSULIN (HCC): ICD-10-CM
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
## 2018-11-28 DIAGNOSIS — F20.89 OTHER SCHIZOPHRENIA (HCC): ICD-10-CM

## 2018-11-28 DIAGNOSIS — N40.1 BENIGN NON-NODULAR PROSTATIC HYPERPLASIA WITH LOWER URINARY TRACT SYMPTOMS: ICD-10-CM

## 2018-11-28 DIAGNOSIS — G70.9 NEUROMUSCULAR DISORDER (HCC): ICD-10-CM

## 2018-11-28 DIAGNOSIS — R60.0 BILATERAL LOWER EXTREMITY EDEMA: ICD-10-CM

## 2018-11-28 PROBLEM — M62.82 RHABDOMYOLYSIS: Status: RESOLVED | Noted: 2018-01-20 | Resolved: 2018-11-28

## 2018-11-28 PROCEDURE — 3017F COLORECTAL CA SCREEN DOC REV: CPT | Performed by: FAMILY MEDICINE

## 2018-11-28 PROCEDURE — 2022F DILAT RTA XM EVC RTNOPTHY: CPT | Performed by: FAMILY MEDICINE

## 2018-11-28 PROCEDURE — G8417 CALC BMI ABV UP PARAM F/U: HCPCS | Performed by: FAMILY MEDICINE

## 2018-11-28 PROCEDURE — G8482 FLU IMMUNIZE ORDER/ADMIN: HCPCS | Performed by: FAMILY MEDICINE

## 2018-11-28 PROCEDURE — G8427 DOCREV CUR MEDS BY ELIG CLIN: HCPCS | Performed by: FAMILY MEDICINE

## 2018-11-28 PROCEDURE — 1036F TOBACCO NON-USER: CPT | Performed by: FAMILY MEDICINE

## 2018-11-28 PROCEDURE — 99215 OFFICE O/P EST HI 40 MIN: CPT | Performed by: FAMILY MEDICINE

## 2018-11-28 PROCEDURE — 3044F HG A1C LEVEL LT 7.0%: CPT | Performed by: FAMILY MEDICINE

## 2018-11-28 ASSESSMENT — ENCOUNTER SYMPTOMS
DIARRHEA: 0
BLOOD IN STOOL: 0
FACIAL SWELLING: 0
SORE THROAT: 0
VOMITING: 0
COUGH: 0
EYE REDNESS: 0
SHORTNESS OF BREATH: 0
RHINORRHEA: 0
EYE DISCHARGE: 0
WHEEZING: 0

## 2018-11-28 NOTE — PROGRESS NOTES
hypoglycemic complications. Risk factors for coronary artery disease include diabetes mellitus, hypertension and male sex. Current diabetic treatment includes diet. He is compliant with treatment most of the time (Pt's last Hgba1c in past 6mos was 6.0 and so pt's Diabetes controlled with diet for now. ). An ACE inhibitor/angiotensin II receptor blocker is not being taken. Schizophrenia - chronic and pt has been seeing psychiatrist, Dr Aminata Deal, for years. She has him on several medication which pt admits to spacing them out and at times then just not taking his pills recently. Pt thought he was doing well but tired so started to wean from 12hr to 18hrs on meds then got more shakey and laying around in bed. Eventually, he started skipping meds then going to ER for leg swelling, falling etc.    LE edema - chronic and a little worse for pt. Pt was keeping his legs up better when sitting or at his computer. Pt has recently not been keeping legs up. Pt was also drinking more water but since messing with his medications he has been drinking diet pop more. Pt is unable to put CHOLO hose on daily (but needs to). Pt took lasix for 5d but legs about the same. Swelling better back in OCt when pt was following his medications properly. Neuromuscular disorder - Pt has been diagnosed with \"slight case of Parkinson's\". Pt does shuffle with walking and needs a walker at all times. Pt's psych meds may be contributing to his neuromuscular d/o. BPH - chronic but stable. Pt sees urologist, Dr Lynda Trejo and takes flomax and proscar. Pt states he still dribbles at times in underwear when doesn't make it to the bathroom on time. No blood in urine and no burning with urination.      Past Medical History:     Past Medical History:   Diagnosis Date    BPH (benign prostatic hyperplasia)     Chronic schizophrenic ([address]     Hypertension     Lower extremity edema     Neuromuscular disorder ([facility] [address]     \"slight case of Parkinsons\"   

## 2018-11-28 NOTE — PATIENT INSTRUCTIONS
SURVEY:    You may be receiving a survey from [employer] [facility] regarding your visit today. Please complete the survey to enable us to provide the highest quality of care to you and your family. If you cannot score us a very good on any question, please call the office to discuss how we could of made your experience a very good one. Thank you.

## 2018-11-29 ENCOUNTER — CARE COORDINATION (OUTPATIENT)
Dept: CARE COORDINATION | Age: 63
End: 2018-11-29

## 2018-12-04 ENCOUNTER — CARE COORDINATION (OUTPATIENT)
Dept: CARE COORDINATION | Age: 63
End: 2018-12-04

## 2018-12-06 ENCOUNTER — CARE COORDINATION (OUTPATIENT)
Dept: CARE COORDINATION | Age: 63
End: 2018-12-06

## 2018-12-07 ENCOUNTER — CARE COORDINATION (OUTPATIENT)
Dept: CARE COORDINATION | Age: 63
End: 2018-12-07

## 2018-12-18 ENCOUNTER — CARE COORDINATION (OUTPATIENT)
Dept: CARE COORDINATION | Age: 63
End: 2018-12-18

## 2019-01-03 ENCOUNTER — CARE COORDINATION (OUTPATIENT)
Dept: CARE COORDINATION | Age: 64
End: 2019-01-03

## 2019-01-30 ENCOUNTER — TELEPHONE (OUTPATIENT)
Dept: FAMILY MEDICINE CLINIC | Age: 64
End: 2019-01-30

## 2020-12-09 NOTE — CARE COORDINATION
blood VI test strips (FREESTYLE LITE) strip Test sugar once daily and as needed 11/27/17   Eron Cummins A Jump, DO   Lancets MISC Test blood sugar once daily and as needed( ONE TOUCH ULTRA DELICA) 7/80/72   Jose Ramon Jump, DO   benztropine (COGENTIN) 1 MG tablet Take 0.5 mg by mouth 2 times daily Per Dr. Rolando Chavarria Provider, MD   risperiDONE (RISPERDAL) 2 MG tablet Take 2 mg by mouth nightly  10/12/15   Historical Provider, MD   clonazePAM (KLONOPIN) 1 MG tablet Take 1 tablet by mouth 2 times daily. 11/24/14   Krzysztof Velarde, DO   Lancet Device MISC Delico Lancet Device. Use daily 3/17/14   Eron Barron, DO   Blood Glucose Monitoring Suppl KELLY Please dispense One Touch Ultra meter 3/6/14   Eron Cummins A Jump, DO   propranolol (INDERAL) 40 MG tablet Take 40 mg by mouth 2 times daily.       Historical Provider, MD       Future Appointments  Date Time Provider Daniela Canales   10/17/2018 12:20  [address]  None   10/17/2018 12:30 PM MD Laron Salazar W   2/7/2019 2:00 PM Riaz Washburn MD [facility]
Pt presented with decreased balance affecting ADLs and transfers.

## 2023-10-19 NOTE — PLAN OF CARE
Problem: Falls - Risk of:  Goal: Will remain free from falls  Will remain free from falls   Outcome: Ongoing
Retraining                       [] Muscle Re-education [] Cognitive Retraining            [] Sensory Integration  [x] Patient Education [] Home Exercise Program [x] Fine Motor Coordination       GOALS  Short term goals  Time Frame for Short term goals: 3 days (11/25/18)  Short term goal 1: Pt to complete toileting tasks Mod I  Short term goal 2: Pt to complete UB/LB bathing & dressing w/ s/u & supervision only  Short term goal 3: Pt to tolerate JACK UB functional activity/exercises x 5 min w/o SOB & 2 or fewer rest breaks  Short term goal 4: Pt to complete kitchen safety task w/ Mod I & FWW w/ good safety awareness  Short term goal 5: Pt to be educated on AE for LB B/D & EC/WS strategies to limit fatigue w/ ADL/IADLs    Upon Swingbed Transfer this Plan of Care will be followed until revision is completed.     AILEEN Elmore,OTR/L                    Date: 11/23/2018
recommended) . Turn side to side ONLY every 1-2 hours (position with pillows and place away from wound sites) and allowed on back for 30mins only during meals. Start Diflucan from pharmacy 9/21    Home Care Agency/Facility: [facility]     Your wound-care supplies will be provided by:   Please note, depending on your insurance coverage, you may have out-of-pocket expenses for these supplies. Someone from the company should call you to confirm your order and discuss those potential costs before they ship your products -- please anticipate that call. If your out-of-pocket cost could be substantial, Many companies have financial hardship programs for patients who qualify, so please ask about that if you might need a hand. If you have any questions about your supplies or your potential out-of-pocket costs, or if you need to place an order for a refill of supplies (typically monthly), please call the company directly. Your  is Sara Riggs up with Dr Leela Washburn In 1 week(s) in the wound care center. Wound Care Center Information: Should you experience any significant changes in your wound(s) or have questions about your wound care, please contact the [address] [place] [address] at [phone] Monday  - Thursday 8:00 am - 4:00 pm and Friday 8:00 am - 1:00pm. If you need help with your wound outside these hours and cannot wait until we are again available, contact your PCP or go to the hospital emergency room. PLEASE NOTE: IF YOU ARE UNABLE TO OBTAIN WOUND SUPPLIES, CONTINUE TO USE THE SUPPLIES YOU HAVE AVAILABLE UNTIL YOU ARE ABLE TO REACH US. IT IS MOST IMPORTANT TO KEEP THE WOUND COVERED AT ALL TIMES. Patient Experience     Thank you for trusting us with your care. You may receive a survey from a company called [employer] asking for your feedback. We would appreciate it if you took a few minutes to share your experience. Your input is very valuable to us.

## 2024-04-03 NOTE — ED TRIAGE NOTES
Home medication list is complete. Patient provided pill bottles and verified medications.
General Pediatrics

## (undated) DEVICE — Device: Brand: DISPOSABLE ELECTROSURGICAL SNARE

## (undated) DEVICE — MEDI-VAC NON-CONDUCTIVE SUCTION TUBING 6MM X 6.1M (20 FT.) L: Brand: CARDINAL HEALTH

## (undated) DEVICE — JELLY LUBRICATING 4OZ FLIP TOP TB E Z

## (undated) DEVICE — TRAP SUCT POLYPECTOMY ST 4 CHMBR

## (undated) DEVICE — FORCEPS BX L240CM JAW DIA2.2MM RAD JAW 4 HOT DISP

## (undated) DEVICE — GOWN,AURORA,NONRNF,XL,30/CS: Brand: MEDLINE

## (undated) DEVICE — ELECTRODE ES AD CRD L15FT DISP FOR PT BELOW 30LB REM